# Patient Record
Sex: FEMALE | HISPANIC OR LATINO | Employment: FULL TIME | URBAN - METROPOLITAN AREA
[De-identification: names, ages, dates, MRNs, and addresses within clinical notes are randomized per-mention and may not be internally consistent; named-entity substitution may affect disease eponyms.]

---

## 2017-06-21 ENCOUNTER — OFFICE VISIT (OUTPATIENT)
Dept: FAMILY MEDICINE | Facility: CLINIC | Age: 22
End: 2017-06-21
Payer: COMMERCIAL

## 2017-06-21 VITALS
WEIGHT: 128 LBS | DIASTOLIC BLOOD PRESSURE: 70 MMHG | HEIGHT: 66 IN | SYSTOLIC BLOOD PRESSURE: 112 MMHG | OXYGEN SATURATION: 100 % | HEART RATE: 53 BPM | TEMPERATURE: 98.9 F | BODY MASS INDEX: 20.57 KG/M2

## 2017-06-21 DIAGNOSIS — R09.81 NASAL CONGESTION: ICD-10-CM

## 2017-06-21 DIAGNOSIS — J01.90 ACUTE SINUSITIS WITH SYMPTOMS > 10 DAYS: Primary | ICD-10-CM

## 2017-06-21 DIAGNOSIS — D50.9 IRON DEFICIENCY ANEMIA, UNSPECIFIED IRON DEFICIENCY ANEMIA TYPE: ICD-10-CM

## 2017-06-21 DIAGNOSIS — Z11.3 SCREEN FOR STD (SEXUALLY TRANSMITTED DISEASE): ICD-10-CM

## 2017-06-21 PROCEDURE — 85027 COMPLETE CBC AUTOMATED: CPT | Performed by: FAMILY MEDICINE

## 2017-06-21 PROCEDURE — 87491 CHLMYD TRACH DNA AMP PROBE: CPT | Performed by: FAMILY MEDICINE

## 2017-06-21 PROCEDURE — 82728 ASSAY OF FERRITIN: CPT | Performed by: FAMILY MEDICINE

## 2017-06-21 PROCEDURE — 36415 COLL VENOUS BLD VENIPUNCTURE: CPT | Performed by: FAMILY MEDICINE

## 2017-06-21 PROCEDURE — 99214 OFFICE O/P EST MOD 30 MIN: CPT | Performed by: FAMILY MEDICINE

## 2017-06-21 PROCEDURE — 83550 IRON BINDING TEST: CPT | Performed by: FAMILY MEDICINE

## 2017-06-21 PROCEDURE — 83540 ASSAY OF IRON: CPT | Performed by: FAMILY MEDICINE

## 2017-06-21 RX ORDER — AZITHROMYCIN 250 MG/1
TABLET, FILM COATED ORAL
Qty: 6 TABLET | Refills: 0 | Status: SHIPPED | OUTPATIENT
Start: 2017-06-21 | End: 2017-06-28

## 2017-06-21 RX ORDER — FERROUS SULFATE 325(65) MG
325 TABLET ORAL DAILY
Refills: 0 | COMMUNITY
Start: 2017-06-21

## 2017-06-21 RX ORDER — FLUTICASONE PROPIONATE 50 MCG
2 SPRAY, SUSPENSION (ML) NASAL DAILY
Qty: 1 BOTTLE | Refills: 0 | Status: SHIPPED | OUTPATIENT
Start: 2017-06-21 | End: 2018-01-09

## 2017-06-21 NOTE — MR AVS SNAPSHOT
"              After Visit Summary   2017    Hailey Faria    MRN: [id number]           Patient Information     Date Of Birth          1995        Visit Information        Provider Department      2017 2:00 PM Shireen Watts MD [facility]        Today's Diagnoses     Acute sinusitis with symptoms > 10 days    -  1    Nasal congestion        Iron deficiency anemia, unspecified iron deficiency anemia type        Screen for STD (sexually transmitted disease)           Follow-ups after your visit        Who to contact     If you have questions or need follow up information about today's clinic visit or your schedule please contact [facility] directly at [phone].  Normal or non-critical lab and imaging results will be communicated to you by MyChart, letter or phone within 4 business days after the clinic has received the results. If you do not hear from us within 7 days, please contact the clinic through MyChart or phone. If you have a critical or abnormal lab result, we will notify you by phone as soon as possible.  Submit refill requests through Scandid or call your pharmacy and they will forward the refill request to us. Please allow 3 business days for your refill to be completed.          Additional Information About Your Visit        MyChart Information     Scandid lets you send messages to your doctor, view your test results, renew your prescriptions, schedule appointments and more. To sign up, go to [email or web address] . Click on \"Log in\" on the left side of the screen, which will take you to the Welcome page. Then click on \"Sign up Now\" on the right side of the page.     You will be asked to enter the access code listed below, as well as some personal information. Please follow the directions to create your username and password.     Your access code is: [email or web address]  Expires: 2017  2:48 PM     Your access code will  in 90 days. If you " "need help or a new code, please call your [facility] clinic or [phone].        Care EveryWhere ID     This is your Care EveryWhere ID. This could be used by other organizations to access your [facility] medical records  [id number]        Your Vitals Were     Pulse Temperature Height Pulse Oximetry BMI (Body Mass Index)       53 98.9  F (37.2  C) (Tympanic) 5' 6\" (1.676 m) 100% 20.66 kg/m2        Blood Pressure from Last 3 Encounters:   06/21/17 112/70   05/02/16 122/74   03/22/15 94/60    Weight from Last 3 Encounters:   06/21/17 128 lb (58.1 kg)   05/02/16 134 lb (60.8 kg)   03/22/15 133 lb (60.3 kg)              We Performed the Following     CBC with platelets     CHLAMYDIA TRACHOMATIS PCR     Ferritin     Iron and iron binding capacity          Today's Medication Changes          These changes are accurate as of: 6/21/17  2:48 PM.  If you have any questions, ask your nurse or doctor.               Start taking these medicines.        Dose/Directions    azithromycin 250 MG tablet   Commonly known as:  ZITHROMAX   Used for:  Acute sinusitis with symptoms > 10 days   Started by:  Shireen Watts MD        Two tablets first day, then one tablet daily for four days.   Quantity:  6 tablet   Refills:  0       fluticasone 50 MCG/ACT spray   Commonly known as:  FLONASE   Used for:  Nasal congestion   Started by:  Shireen Watts MD        Dose:  2 spray   Spray 2 sprays into both nostrils daily   Quantity:  1 Bottle   Refills:  0         These medicines have changed or have updated prescriptions.        Dose/Directions    ferrous sulfate 325 (65 FE) MG tablet   Commonly known as:  IRON   This may have changed:  additional instructions   Changed by:  Shireen Watts MD        Take one by mouth daily   Refills:  0            Where to get your medicines      These medications were sent to [facility] [place] - [address] [place] [address]     Phone:  " [phone]     azithromycin 250 MG tablet    fluticasone 50 MCG/ACT spray                Primary Care Provider Office Phone # Fax #    NIKHIL Garcia [phone]       [facility]  [address]  [facility] [address]        Equal Access to Services     ANTONELLA LIANG : Hadii aad ku hadasho Soomaali, waaxda luqadaha, qaybta kaalmada adeegyada, waxay idiin hayaan adeeg kharash la'aan . So [facility] [phone].    ATENCIÓN: Si habla español, tiene a jackson disposición servicios gratuitos de asistencia lingüística. Giovanni al [phone].    We comply with applicable federal civil rights laws and [place] laws. We do not discriminate on the basis of race, color, national origin, age, disability sex, sexual orientation or gender identity.            Thank you!     Thank you for choosing [facility]  for your [place]. Our goal is always to provide you with excellent care. Hearing back from our patients is one way we can continue to improve our services. Please take a few minutes to complete the written survey that you may receive in the mail after your visit with us. Thank you!             Your Updated Medication List - Protect others around you: Learn how to safely use, store and throw away your medicines at www.disposemymeds.org.          This list is accurate as of: 6/21/17  2:48 PM.  Always use your most recent med list.                   Brand Name Dispense Instructions for use Diagnosis    azithromycin 250 MG tablet    ZITHROMAX    6 tablet    Two tablets first day, then one tablet daily for four days.    Acute sinusitis with symptoms > 10 days       ferrous sulfate 325 (65 FE) MG tablet    IRON     Take one by mouth daily        fluticasone 50 MCG/ACT spray    FLONASE    1 Bottle    Spray 2 sprays into both nostrils daily    Nasal congestion       levonorgestrel 20 MCG/24HR IUD    MIRENA    1 each    1 each (20 mcg) by Intrauterine route once for 1 dose

## 2017-06-21 NOTE — NURSING NOTE
"Chief Complaint   Patient presents with     URI       Initial /70  Pulse 53  Temp 98.9  F (37.2  C) (Tympanic)  Ht 5' 6\" (1.676 m)  Wt 128 lb (58.1 kg)  SpO2 100%  BMI 20.66 kg/m2 Estimated body mass index is 20.66 kg/(m^2) as calculated from the following:    Height as of this encounter: 5' 6\" (1.676 m).    Weight as of this encounter: 128 lb (58.1 kg).  Medication Reconciliation: complete  "

## 2017-06-21 NOTE — PROGRESS NOTES
SUBJECTIVE:                                                    Hailey Faria is a 21 year old female who presents to clinic today for the following health issues:      RESPIRATORY SYMPTOMS      Duration: 2 weeks     Description  nasal congestion, facial pain/pressure, headache and fatigue/malaise. Sneezing. No eye symptoms. Ears feel normal.     Severity: moderate to severe    Accompanying signs and symptoms: headache  persistent tiredness. Would like CBC checked for low iron levels     History (predisposing factors):  none    Precipitating or alleviating factors: None    Therapies tried and outcome:  Vitamin C      H/o iron deficiency anemia.         Problem list and histories reviewed & adjusted, as indicated.  Additional history: as documented    Patient Active Problem List   Diagnosis     Health Care Home     Past Surgical History:   Procedure Laterality Date     DENTAL SURGERY         Social History   Substance Use Topics     Smoking status: Never Smoker     Smokeless tobacco: Never Used     Alcohol use 0.0 oz/week     0 Standard drinks or equivalent per week      Comment: 2 a month     Family History   Problem Relation Age of Onset     Family History Negative           Current Outpatient Prescriptions   Medication Sig Dispense Refill     ferrous sulfate (IRON) 325 (65 FE) MG tablet Take one by mouth daily  0     levonorgestrel (MIRENA) 20 MCG/24HR IUD 1 each (20 mcg) by Intrauterine route once for 1 dose 1 each 0     azithromycin (ZITHROMAX) 250 MG tablet Two tablets first day, then one tablet daily for four days. 6 tablet 0     fluticasone (FLONASE) 50 MCG/ACT spray Spray 2 sprays into both nostrils daily 1 Bottle 0     No Known Allergies    Reviewed and updated as needed this visit by clinical staff       Reviewed and updated as needed this visit by Provider         ROS:  C: NEGATIVE for fever, chills, change in weight  GI: NEGATIVE for nausea, abdominal pain, heartburn, or change in bowel  "habits    OBJECTIVE:                                                    /70  Pulse 53  Temp 98.9  F (37.2  C) (Tympanic)  Ht 5' 6\" (1.676 m)  Wt 128 lb (58.1 kg)  SpO2 100%  BMI 20.66 kg/m2  Body mass index is 20.66 kg/(m^2).   GENERAL: healthy, alert, well nourished, well hydrated, no distress  EYES: Eyes grossly normal to inspection, extraocular movements - intact, and PERRL  HENT: ear canals- normal; TMs- normal; Nose- swollen b/l turbinates.; b/l maxillary sinus tenderness noted.  Mouth- no ulcers, no lesions  NECK: no tenderness, no adenopathy, no asymmetry, no masses, no stiffness; thyroid- normal to palpation  RESP: lungs clear to auscultation - no rales, no rhonchi, no wheezes  CV: regular rates and rhythm, normal S1 S2, no S3 or S4 and no murmur, no click or rub -         ASSESSMENT/PLAN:                                                      1. Acute sinusitis with symptoms > 10 days  Starting the patient on Z-pack for acute sinus infection. Recommended to stay well hydrated.     - azithromycin (ZITHROMAX) 250 MG tablet; Two tablets first day, then one tablet daily for four days.  Dispense: 6 tablet; Refill: 0    2. Nasal congestion  Start flonase to lower the nasal congestion. Stay well hydated.   - fluticasone (FLONASE) 50 MCG/ACT spray; Spray 2 sprays into both nostrils daily  Dispense: 1 Bottle; Refill: 0    3. Iron deficiency anemia, unspecified iron deficiency anemia type  Previous labs reviewed. Patient has significant iron deficiency anemia. Since then she had a Mirena IUD placed.   Labs ordered. Await the test results.  - Iron and iron binding capacity  - Ferritin  - CBC with platelets    4. Screen for STD (sexually transmitted disease)    - CHLAMYDIA TRACHOMATIS PCR      Follow up with Provider - as needed     Shireen Watts MD  [facility]    "

## 2017-06-22 LAB
C TRACH DNA SPEC QL NAA+PROBE: NORMAL
ERYTHROCYTE [DISTWIDTH] IN BLOOD BY AUTOMATED COUNT: 25.9 % (ref 10–15)
FERRITIN SERPL-MCNC: 12 NG/ML (ref 12–150)
HCT VFR BLD AUTO: 30.3 % (ref 35–47)
HGB BLD-MCNC: 8.4 G/DL (ref 11.7–15.7)
IRON SATN MFR SERPL: 14 % (ref 15–46)
IRON SERPL-MCNC: 68 UG/DL (ref 35–180)
MCH RBC QN AUTO: 22.5 PG (ref 26.5–33)
MCHC RBC AUTO-ENTMCNC: 27.7 G/DL (ref 31.5–36.5)
MCV RBC AUTO: 81 FL (ref 78–100)
PLATELET # BLD AUTO: 221 10E9/L (ref 150–450)
RBC # BLD AUTO: 3.74 10E12/L (ref 3.8–5.2)
SPECIMEN SOURCE: NORMAL
TIBC SERPL-MCNC: 473 UG/DL (ref 240–430)
WBC # BLD AUTO: 4.7 10E9/L (ref 4–11)

## 2017-06-23 NOTE — PROGRESS NOTES
Please call the patient to notify patient that the blood work is abnormal. i would like to see her back to review results with me next week      Shireen Watts MD

## 2017-06-28 ENCOUNTER — TELEPHONE (OUTPATIENT)
Dept: FAMILY MEDICINE | Facility: CLINIC | Age: 22
End: 2017-06-28

## 2017-06-28 ENCOUNTER — OFFICE VISIT (OUTPATIENT)
Dept: FAMILY MEDICINE | Facility: CLINIC | Age: 22
End: 2017-06-28
Payer: COMMERCIAL

## 2017-06-28 VITALS
TEMPERATURE: 97.4 F | DIASTOLIC BLOOD PRESSURE: 72 MMHG | BODY MASS INDEX: 20.41 KG/M2 | HEIGHT: 66 IN | OXYGEN SATURATION: 98 % | SYSTOLIC BLOOD PRESSURE: 118 MMHG | HEART RATE: 60 BPM | WEIGHT: 127 LBS

## 2017-06-28 DIAGNOSIS — D50.9 IRON DEFICIENCY ANEMIA, UNSPECIFIED IRON DEFICIENCY ANEMIA TYPE: Primary | ICD-10-CM

## 2017-06-28 PROCEDURE — 36415 COLL VENOUS BLD VENIPUNCTURE: CPT | Performed by: FAMILY MEDICINE

## 2017-06-28 PROCEDURE — 83021 HEMOGLOBIN CHROMOTOGRAPHY: CPT | Mod: 90 | Performed by: FAMILY MEDICINE

## 2017-06-28 PROCEDURE — 99214 OFFICE O/P EST MOD 30 MIN: CPT | Performed by: FAMILY MEDICINE

## 2017-06-28 PROCEDURE — 85060 BLOOD SMEAR INTERPRETATION: CPT | Performed by: FAMILY MEDICINE

## 2017-06-28 PROCEDURE — 85025 COMPLETE CBC W/AUTO DIFF WBC: CPT | Performed by: FAMILY MEDICINE

## 2017-06-28 PROCEDURE — 99000 SPECIMEN HANDLING OFFICE-LAB: CPT | Performed by: FAMILY MEDICINE

## 2017-06-28 PROCEDURE — 85045 AUTOMATED RETICULOCYTE COUNT: CPT | Performed by: FAMILY MEDICINE

## 2017-06-28 NOTE — MR AVS SNAPSHOT
"              After Visit Summary   2017    Hailey Faria    MRN: [id number]           Patient Information     Date Of Birth          1995        Visit Information        Provider Department      2017 2:00 PM Shireen Watts MD [facility]        Today's Diagnoses     Iron deficiency anemia, unspecified iron deficiency anemia type    -  1       Follow-ups after your visit        Who to contact     If you have questions or need follow up information about today's clinic visit or your schedule please contact [facility] directly at [phone].  Normal or non-critical lab and imaging results will be communicated to you by Endymedhart, letter or phone within 4 business days after the clinic has received the results. If you do not hear from us within 7 days, please contact the clinic through Endymedhart or phone. If you have a critical or abnormal lab result, we will notify you by phone as soon as possible.  Submit refill requests through deskwolf or call your pharmacy and they will forward the refill request to us. Please allow 3 business days for your refill to be completed.          Additional Information About Your Visit        MyChart Information     deskwolf lets you send messages to your doctor, view your test results, renew your prescriptions, schedule appointments and more. To sign up, go to [email or web address] . Click on \"Log in\" on the left side of the screen, which will take you to the Welcome page. Then click on \"Sign up Now\" on the right side of the page.     You will be asked to enter the access code listed below, as well as some personal information. Please follow the directions to create your username and password.     Your access code is: [email or web address]  Expires: 2017  2:48 PM     Your access code will  in 90 days. If you need help or a new code, please call your [facility] or [phone].        Care EveryWhere ID     This is your Care " "EveryWhere ID. This could be used by other organizations to access your [facility] medical records  [id number]        Your Vitals Were     Pulse Temperature Height Pulse Oximetry BMI (Body Mass Index)       60 97.4  F (36.3  C) (Tympanic) 5' 6\" (1.676 m) 98% 20.5 kg/m2        Blood Pressure from Last 3 Encounters:   06/28/17 118/72   06/21/17 112/70   05/02/16 122/74    Weight from Last 3 Encounters:   06/28/17 127 lb (57.6 kg)   06/21/17 128 lb (58.1 kg)   05/02/16 134 lb (60.8 kg)              We Performed the Following     Blood Morphology Pathologist Review     CBC with platelets and differential     HGB Eval Reflex to ELP or RBC Solubility     Reticulocyte Count        Primary Care Provider Office Phone # Fax #    NIKHIL Garcia [phone]       [facility]  [address]  [facility] [address]        Equal Access to Services     MADAY [facility] Center[facility] : Hadii aad ku hadasho Soomaali, waaxda luqadaha, qaybta kaalmada adeegyada, waxay idiin hayministerion roxi khalfonso shannon . So [facility] [phone].    ATENCIÓN: Si habla español, tiene a jackson disposición servicios gratuitos de asistencia lingüística. Llame al [phone].    We comply with applicable federal civil rights laws and [place] laws. We do not discriminate on the basis of race, color, national origin, age, disability sex, sexual orientation or gender identity.            Thank you!     Thank you for choosing [facility]  for your care. Our goal is always to provide you with excellent care. Hearing back from our patients is one way we can continue to improve our services. Please take a few minutes to complete the written survey that you may receive in the mail after your visit with us. Thank you!             Your Updated Medication List - Protect others around you: Learn how to safely use, store and throw away your medicines at www.disposemymeds.org.          This list is accurate as of: 6/28/17 11:59 PM. "  Always use your most recent med list.                   Brand Name Dispense Instructions for use Diagnosis    ferrous sulfate 325 (65 FE) MG tablet    IRON     Take one by mouth daily        fluticasone 50 MCG/ACT spray    FLONASE    1 Bottle    Spray 2 sprays into both nostrils daily    Nasal congestion       levonorgestrel 20 MCG/24HR IUD    MIRENA    1 each    1 each (20 mcg) by Intrauterine route once for 1 dose

## 2017-06-28 NOTE — PROGRESS NOTES
"  SUBJECTIVE:                                                    Hailey Faria is a 21 year old female who presents to clinic today for the following health issues:      Concern - follow-up abnormal labs from 6/21/17      Problem list and histories reviewed & adjusted, as indicated.  Additional history: as documented    Patient Active Problem List   Diagnosis     Health Care Home     Past Surgical History:   Procedure Laterality Date     DENTAL SURGERY         Social History   Substance Use Topics     Smoking status: Never Smoker     Smokeless tobacco: Never Used     Alcohol use 0.0 oz/week     0 Standard drinks or equivalent per week      Comment: 2 a month     Family History   Problem Relation Age of Onset     Family History Negative           Current Outpatient Prescriptions   Medication Sig Dispense Refill     ferrous sulfate (IRON) 325 (65 FE) MG tablet Take one by mouth daily  0     levonorgestrel (MIRENA) 20 MCG/24HR IUD 1 each (20 mcg) by Intrauterine route once for 1 dose 1 each 0     fluticasone (FLONASE) 50 MCG/ACT spray Spray 2 sprays into both nostrils daily 1 Bottle 0     No Known Allergies    Reviewed and updated as needed this visit by clinical staff       Reviewed and updated as needed this visit by Provider         ROS:  C: NEGATIVE for fever, chills, change in weight  E/M: NEGATIVE for ear, mouth and throat problems  R: NEGATIVE for significant cough or SOB  CV: NEGATIVE for chest pain, palpitations or peripheral edema    OBJECTIVE:                                                    /72  Pulse 60  Temp 97.4  F (36.3  C) (Tympanic)  Ht 5' 6\" (1.676 m)  Wt 127 lb (57.6 kg)  SpO2 98%  BMI 20.5 kg/m2  Body mass index is 20.5 kg/(m^2).   GENERAL: healthy, alert, well nourished, well hydrated, no distress  HENT: ear canals- normal; TMs- normal; Nose- normal; Mouth- no ulcers, no lesions  NECK: no tenderness, no adenopathy, no asymmetry, no masses, no stiffness; thyroid- normal to " palpation  RESP: lungs clear to auscultation - no rales, no rhonchi, no wheezes  CV: regular rates and rhythm, normal S1 S2, no S3 or S4 and no murmur, no click or rub -  ABDOMEN: soft, no tenderness, no  hepatosplenomegaly, no masses, normal bowel sounds         ASSESSMENT/PLAN:                                                        ICD-10-CM    1. Iron deficiency anemia, unspecified iron deficiency anemia type D50.9 HGB Eval Reflex to ELP or RBC Solubility     Reticulocyte Count     Blood Morphology Pathologist Review     CBC with platelets and differential   Labs reviewed with the patient.   Questionable etiology. Patient has Mirena in place and does not get a period and also she has been using iron supplements orally.   Recommended further investigation. Need to rule out thalassemia.   Also should evaluate for GI bleed.  If all work up in inconclusive, i would suggest trial of IV iron to see if hemoglobin reposnds to that or not. May also need hematology consult.     Follow up with Provider - after the results are back.      Shireen Watts MD  [facility] [place]

## 2017-06-28 NOTE — TELEPHONE ENCOUNTER
Pt would like to discuss lab results over the phone with Dr Watts rather than coming in.  Pt does have a scheduled appt at 2 pm today

## 2017-06-28 NOTE — NURSING NOTE
"Chief Complaint   Patient presents with     Abnormal Labs       Initial /72  Pulse 60  Temp 97.4  F (36.3  C) (Tympanic)  Ht 5' 6\" (1.676 m)  Wt 127 lb (57.6 kg)  SpO2 98%  BMI 20.5 kg/m2 Estimated body mass index is 20.5 kg/(m^2) as calculated from the following:    Height as of this encounter: 5' 6\" (1.676 m).    Weight as of this encounter: 127 lb (57.6 kg).  Medication Reconciliation: complete  "

## 2017-06-28 NOTE — TELEPHONE ENCOUNTER
LUCRETIA Watts   Spoke with the patient and informed her it would be best for her to come in. She states that it is difficult for her to come in because she has to come on her lunch break. Assured the patient that it would be a 20 minute appointment.  Raven Ferreira RN

## 2017-06-29 LAB
BASOPHILS # BLD AUTO: 0.1 10E9/L (ref 0–0.2)
BASOPHILS NFR BLD AUTO: 1.7 %
DIFFERENTIAL METHOD BLD: ABNORMAL
EOSINOPHIL # BLD AUTO: 0.1 10E9/L (ref 0–0.7)
EOSINOPHIL NFR BLD AUTO: 3.2 %
ERYTHROCYTE [DISTWIDTH] IN BLOOD BY AUTOMATED COUNT: 27.1 % (ref 10–15)
HCT VFR BLD AUTO: 32 % (ref 35–47)
HGB BLD-MCNC: 9.2 G/DL (ref 11.7–15.7)
IMM GRANULOCYTES # BLD: 0 10E9/L (ref 0–0.4)
IMM GRANULOCYTES NFR BLD: 0.2 %
LYMPHOCYTES # BLD AUTO: 2 10E9/L (ref 0.8–5.3)
LYMPHOCYTES NFR BLD AUTO: 49.4 %
MCH RBC QN AUTO: 23.7 PG (ref 26.5–33)
MCHC RBC AUTO-ENTMCNC: 28.8 G/DL (ref 31.5–36.5)
MCV RBC AUTO: 83 FL (ref 78–100)
MONOCYTES # BLD AUTO: 0.4 10E9/L (ref 0–1.3)
MONOCYTES NFR BLD AUTO: 9.7 %
NEUTROPHILS # BLD AUTO: 1.4 10E9/L (ref 1.6–8.3)
NEUTROPHILS NFR BLD AUTO: 35.8 %
NRBC # BLD AUTO: 0 10*3/UL
NRBC BLD AUTO-RTO: 0 /100
PLATELET # BLD AUTO: 237 10E9/L (ref 150–450)
RBC # BLD AUTO: 3.88 10E12/L (ref 3.8–5.2)
RETICS # AUTO: 182.3 10E9/L (ref 25–95)
RETICS/RBC NFR AUTO: 4.7 % (ref 0.5–2)
WBC # BLD AUTO: 4 10E9/L (ref 4–11)

## 2017-06-30 LAB
COPATH REPORT: NORMAL
HGB A1 MFR BLD: 96.9 %
HGB A2 MFR BLD: 2.7 %
HGB C MFR BLD: 0 %
HGB E MFR BLD: 0 %
HGB F MFR BLD: 0.4 %
HGB FRACT BLD ELPH-IMP: NORMAL
HGB OTHER MFR BLD: 0 %
HGB S BLD QL SOLY: NORMAL
HGB S MFR BLD: 0 %
PATH INTERP BLD-IMP: NORMAL

## 2017-07-03 NOTE — PROGRESS NOTES
Please call the patient to f/u with me to go over the test results and for further investigation.    Shireen Watts MD

## 2017-07-12 ENCOUNTER — TELEPHONE (OUTPATIENT)
Dept: FAMILY MEDICINE | Facility: CLINIC | Age: 22
End: 2017-07-12

## 2017-07-12 DIAGNOSIS — D50.9 HYPOCHROMIC ANEMIA: Primary | ICD-10-CM

## 2017-07-12 NOTE — TELEPHONE ENCOUNTER
Patient notified with information noted below from provider and agrees with plan.  Colette Dickerson RN  [facility] [employer]

## 2017-07-12 NOTE — TELEPHONE ENCOUNTER
Pt called back and does not want to come in to review her test results due to a copay each time she comes into the clinic. She would prefer to discuss the results over the phone. Did you want to schedule a telephone appt? Hay kelleye. Pt can be at [phone]. Thank you.  [place],

## 2017-07-12 NOTE — TELEPHONE ENCOUNTER
Patient notified and would like referral to hematologist for management.  Will do more research on the infusions but prefers to discuss ongoing with hematology for sure.  Colette Dickerson RN  [facility] [employer]

## 2017-07-12 NOTE — TELEPHONE ENCOUNTER
I would like to set her up for iron infusion. If she is in agreement to that, I will order it.   Otherwise, she can she hematologist for management of anemia as well.     Let me know, what she would like to do.    Shireen Watts MD  [facility] [place]

## 2017-07-13 ENCOUNTER — TELEPHONE (OUTPATIENT)
Dept: FAMILY MEDICINE | Facility: CLINIC | Age: 22
End: 2017-07-13

## 2017-07-13 NOTE — TELEPHONE ENCOUNTER
Pt called and has now decided to go ahead with iron infusions.  Please call pt so can be set up.  She is also going to make an appt with the specialist    Please call pt

## 2017-07-13 NOTE — TELEPHONE ENCOUNTER
I would suggest that she see the hematologist first and they can plan the infusion at the infusion center they use.     Shirene Watts MD  [facility], [place]

## 2017-07-13 NOTE — TELEPHONE ENCOUNTER
Spoke with patient and informed of below. Patient/ parent verbalized understanding and agrees with plan.   Kathrine Franco RN   [facility] - Triage

## 2017-07-19 ENCOUNTER — OFFICE VISIT (OUTPATIENT)
Dept: ONCOLOGY | Facility: CLINIC | Age: 22
End: 2017-07-19
Attending: FAMILY MEDICINE
Payer: COMMERCIAL

## 2017-07-19 ENCOUNTER — HOSPITAL ENCOUNTER (OUTPATIENT)
Facility: CLINIC | Age: 22
Setting detail: SPECIMEN
End: 2017-07-19
Attending: INTERNAL MEDICINE
Payer: COMMERCIAL

## 2017-07-19 ENCOUNTER — HOSPITAL ENCOUNTER (OUTPATIENT)
Facility: CLINIC | Age: 22
Setting detail: SPECIMEN
Discharge: HOME | End: 2017-07-19
Attending: INTERNAL MEDICINE | Admitting: INTERNAL MEDICINE
Payer: COMMERCIAL

## 2017-07-19 VITALS
DIASTOLIC BLOOD PRESSURE: 72 MMHG | HEART RATE: 51 BPM | BODY MASS INDEX: 20.98 KG/M2 | TEMPERATURE: 98.2 F | SYSTOLIC BLOOD PRESSURE: 122 MMHG | WEIGHT: 130 LBS | OXYGEN SATURATION: 100 % | RESPIRATION RATE: 16 BRPM

## 2017-07-19 DIAGNOSIS — D64.9 NORMOCYTIC ANEMIA: Primary | ICD-10-CM

## 2017-07-19 LAB
ALBUMIN SERPL-MCNC: 3.8 G/DL (ref 3.4–5)
ALP SERPL-CCNC: 84 U/L (ref 40–150)
ALT SERPL W P-5'-P-CCNC: 27 U/L (ref 0–50)
ANION GAP SERPL CALCULATED.3IONS-SCNC: 9 MMOL/L (ref 3–14)
AST SERPL W P-5'-P-CCNC: 24 U/L (ref 0–45)
BASOPHILS # BLD AUTO: 0 10E9/L (ref 0–0.2)
BASOPHILS NFR BLD AUTO: 1.2 %
BILIRUB SERPL-MCNC: 0.5 MG/DL (ref 0.2–1.3)
BUN SERPL-MCNC: 11 MG/DL (ref 7–30)
CALCIUM SERPL-MCNC: 9.4 MG/DL (ref 8.5–10.1)
CHLORIDE SERPL-SCNC: 107 MMOL/L (ref 94–109)
CO2 SERPL-SCNC: 25 MMOL/L (ref 20–32)
CREAT SERPL-MCNC: 0.58 MG/DL (ref 0.52–1.04)
DIFFERENTIAL METHOD BLD: ABNORMAL
EOSINOPHIL # BLD AUTO: 0.1 10E9/L (ref 0–0.7)
EOSINOPHIL NFR BLD AUTO: 3.3 %
ERYTHROCYTE [DISTWIDTH] IN BLOOD BY AUTOMATED COUNT: 22.9 % (ref 10–15)
FERRITIN SERPL-MCNC: 34 NG/ML (ref 12–150)
GFR SERPL CREATININE-BSD FRML MDRD: NORMAL ML/MIN/1.7M2
GLUCOSE SERPL-MCNC: 93 MG/DL (ref 70–99)
HCT VFR BLD AUTO: 34.6 % (ref 35–47)
HGB BLD-MCNC: 10.4 G/DL (ref 11.7–15.7)
IMM GRANULOCYTES # BLD: 0 10E9/L (ref 0–0.4)
IMM GRANULOCYTES NFR BLD: 0 %
IRON SATN MFR SERPL: 11 % (ref 15–46)
IRON SERPL-MCNC: 46 UG/DL (ref 35–180)
LDH SERPL L TO P-CCNC: 160 U/L (ref 81–234)
LYMPHOCYTES # BLD AUTO: 1.6 10E9/L (ref 0.8–5.3)
LYMPHOCYTES NFR BLD AUTO: 49.5 %
MCH RBC QN AUTO: 25.9 PG (ref 26.5–33)
MCHC RBC AUTO-ENTMCNC: 30.1 G/DL (ref 31.5–36.5)
MCV RBC AUTO: 86 FL (ref 78–100)
MONOCYTES # BLD AUTO: 0.3 10E9/L (ref 0–1.3)
MONOCYTES NFR BLD AUTO: 8.2 %
NEUTROPHILS # BLD AUTO: 1.2 10E9/L (ref 1.6–8.3)
NEUTROPHILS NFR BLD AUTO: 37.8 %
NRBC # BLD AUTO: 0 10*3/UL
NRBC BLD AUTO-RTO: 0 /100
PLATELET # BLD AUTO: 232 10E9/L (ref 150–450)
POTASSIUM SERPL-SCNC: 3.7 MMOL/L (ref 3.4–5.3)
PROT SERPL-MCNC: 7.4 G/DL (ref 6.8–8.8)
RBC # BLD AUTO: 4.01 10E12/L (ref 3.8–5.2)
RETICS # AUTO: 97 10E9/L (ref 25–95)
RETICS/RBC NFR AUTO: 2.4 % (ref 0.5–2)
SODIUM SERPL-SCNC: 141 MMOL/L (ref 133–144)
TIBC SERPL-MCNC: 413 UG/DL (ref 240–430)
TRANSFERRIN SERPL-MCNC: 336 MG/DL (ref 210–360)
TSH SERPL DL<=0.05 MIU/L-ACNC: 1.39 MU/L (ref 0.4–4)
VIT B12 SERPL-MCNC: 526 PG/ML (ref 193–986)
WBC # BLD AUTO: 3.3 10E9/L (ref 4–11)

## 2017-07-19 PROCEDURE — 84466 ASSAY OF TRANSFERRIN: CPT | Performed by: INTERNAL MEDICINE

## 2017-07-19 PROCEDURE — 82728 ASSAY OF FERRITIN: CPT | Performed by: INTERNAL MEDICINE

## 2017-07-19 PROCEDURE — 83540 ASSAY OF IRON: CPT | Performed by: INTERNAL MEDICINE

## 2017-07-19 PROCEDURE — 40000847 ZZHCL STATISTIC MORPHOLOGY W/INTERP HISTOLOGY TC 85060: Performed by: INTERNAL MEDICINE

## 2017-07-19 PROCEDURE — 82668 ASSAY OF ERYTHROPOIETIN: CPT | Performed by: INTERNAL MEDICINE

## 2017-07-19 PROCEDURE — 85045 AUTOMATED RETICULOCYTE COUNT: CPT | Performed by: INTERNAL MEDICINE

## 2017-07-19 PROCEDURE — 83550 IRON BINDING TEST: CPT | Performed by: INTERNAL MEDICINE

## 2017-07-19 PROCEDURE — 80053 COMPREHEN METABOLIC PANEL: CPT | Performed by: INTERNAL MEDICINE

## 2017-07-19 PROCEDURE — 99211 OFF/OP EST MAY X REQ PHY/QHP: CPT

## 2017-07-19 PROCEDURE — 84443 ASSAY THYROID STIM HORMONE: CPT | Performed by: INTERNAL MEDICINE

## 2017-07-19 PROCEDURE — 85025 COMPLETE CBC W/AUTO DIFF WBC: CPT | Performed by: INTERNAL MEDICINE

## 2017-07-19 PROCEDURE — 83615 LACTATE (LD) (LDH) ENZYME: CPT | Performed by: INTERNAL MEDICINE

## 2017-07-19 PROCEDURE — 83010 ASSAY OF HAPTOGLOBIN QUANT: CPT | Performed by: INTERNAL MEDICINE

## 2017-07-19 PROCEDURE — 99203 OFFICE O/P NEW LOW 30 MIN: CPT | Performed by: INTERNAL MEDICINE

## 2017-07-19 PROCEDURE — 85060 BLOOD SMEAR INTERPRETATION: CPT | Performed by: INTERNAL MEDICINE

## 2017-07-19 PROCEDURE — 82607 VITAMIN B-12: CPT | Performed by: INTERNAL MEDICINE

## 2017-07-19 ASSESSMENT — PAIN SCALES - GENERAL: PAINLEVEL: NO PAIN (0)

## 2017-07-19 NOTE — PROGRESS NOTES
"Oncology Rooming Note    July 19, 2017 2:26 PM   Hailey Faria is a 22 year old female who presents for:    Chief Complaint   Patient presents with     Oncology Clinic Visit     Anemia      Initial Vitals: /72 (BP Location: Right arm, Patient Position: Sitting, Cuff Size: Adult Regular)  Pulse 51  Temp 98.2  F (36.8  C) (Oral)  Resp 16  Wt 59 kg (130 lb)  SpO2 100%  BMI 20.98 kg/m2 Estimated body mass index is 20.98 kg/(m^2) as calculated from the following:    Height as of 6/28/17: 1.676 m (5' 6\").    Weight as of this encounter: 59 kg (130 lb). Body surface area is 1.66 meters squared.  No Pain (0) Comment: Data Unavailable   No LMP recorded. Patient is not currently having periods (Reason: IUD).  Allergies reviewed: Yes  Medications reviewed: Yes    Medications: Medication refills not needed today.  Pharmacy name entered into [employer]: [facility] [address] [place] [id number]    Clinical concerns: None     4 minutes for nursing intake (face to face time)     Mira Jose MA            "

## 2017-07-19 NOTE — MR AVS SNAPSHOT
"              After Visit Summary   7/19/2017    Hailey Faria    MRN: [id number]           Patient Information     Date Of Birth          1995        Visit Information        Provider Department      7/19/2017 2:30 PM Ines Camp MD [facility]        Today's Diagnoses     Normocytic anemia    -  1      Care Instructions    1- Labs today  2- RTC 2 weeks with infusion appointment for injectafer          Follow-ups after your visit        Your next 10 appointments already scheduled     Jul 31, 2017  7:30 AM CDT   Level 2 with  INFUSION CHAIR 8   [facility] ([facility])    [facility] [place]  [address]  [facility] [address] [phone]              Who to contact     If you have questions or need follow up information about today's clinic visit or your schedule please contact [facility] directly at [phone].  Normal or non-critical lab and imaging results will be communicated to you by OpenSpanhart, letter or phone within 4 business days after the clinic has received the results. If you do not hear from us within 7 days, please contact the clinic through OpenSpanhart or phone. If you have a critical or abnormal lab result, we will notify you by phone as soon as possible.  Submit refill requests through IndigoVision or call your pharmacy and they will forward the refill request to us. Please allow 3 business days for your refill to be completed.          Additional Information About Your Visit        [employer] Information     IndigoVision lets you send messages to your doctor, view your test results, renew your prescriptions, schedule appointments and more. To sign up, go to [email or web address] . Click on \"Log in\" on the left side of the screen, which will take you to the Welcome page. Then click on \"Sign up Now\" on the right side of the page.     You will be asked to enter the access code listed below, as well as " some personal information. Please follow the directions to create your username and password.     Your access code is: [email or web address]  Expires: 2017  2:48 PM     Your access code will  in 90 days. If you need help or a new code, please call your [facility] or [phone].        Care EveryWhere ID     This is your Care EveryWhere ID. This could be used by other organizations to access your [place] medical records  [id number]        Your Vitals Were     Pulse Temperature Respirations Pulse Oximetry BMI (Body Mass Index)       51 98.2  F (36.8  C) (Oral) 16 100% 20.98 kg/m2        Blood Pressure from Last 3 Encounters:   17 122/72   17 118/72   17 112/70    Weight from Last 3 Encounters:   17 59 kg (130 lb)   17 57.6 kg (127 lb)   17 58.1 kg (128 lb)              We Performed the Following     Bld morphology pathology review     CBC with platelets differential     Comprehensive metabolic panel     Erythropoietin     Ferritin     Haptoglobin     Iron and iron binding capacity     Lactate Dehydrogenase     Reticulocyte count     Transferrin     TSH     Vitamin B12        Primary Care Provider Office Phone # Fax #    NIKHIL Garcia [phone]       [facility]  [address] [facility] [address]        Equal Access to Services     ANTONELLA LIANG : Hadii aad ku hadasho Soomaali, waaxda luqadaha, qaybta kaalmada adeegyada, ken miller. So [facility] [phone].    ATENCIÓN: Si habla español, tiene a jackson disposición servicios gratuitos de asistencia lingüística. Giovanni al [phone].    We comply with applicable federal civil rights laws and [place] laws. We do not discriminate on the basis of race, color, national origin, age, disability sex, sexual orientation or gender identity.            Thank you!     Thank you for choosing [facility]  for your care. Our goal is always to provide  you with excellent care. Hearing back from our patients is one way we can continue to improve our services. Please take a few minutes to complete the written survey that you may receive in the mail after your visit with us. Thank you!             Your Updated Medication List - Protect others around you: Learn how to safely use, store and throw away your medicines at www.disposemymeds.org.          This list is accurate as of: 7/19/17  3:18 PM.  Always use your most recent med list.                   Brand Name Dispense Instructions for use Diagnosis    ferrous sulfate 325 (65 FE) MG tablet    IRON     Take one by mouth daily        fluticasone 50 MCG/ACT spray    FLONASE    1 Bottle    Spray 2 sprays into both nostrils daily    Nasal congestion       levonorgestrel 20 MCG/24HR IUD    MIRENA    1 each    1 each (20 mcg) by Intrauterine route once for 1 dose

## 2017-07-20 LAB
COPATH REPORT: NORMAL
EPO SERPL-ACNC: 59
HAPTOGLOB SERPL-MCNC: 49 MG/DL (ref 15–200)

## 2017-07-23 NOTE — PROGRESS NOTES
[facility] PHYSICIANS  HEMATOLOGY ONCOLOGY    REASON FOR CONSULTATION:  Normocytic anemia with history of iron deficiency.      Referring Provider:  Dr. Shireen Watts.    HISTORY OF PRESENT ILLNESS:  The patient is a 22-year-old lady who states that she has had long-term issues with anemia since her younger years.  She also states that in 2010 she had an evaluation by an esophagogastroduodenoscopy and colonoscopy in the hospital, and no source of blood loss was found.  He also states that since approximately 2 years ago she had an episode of rectal bleeding, and at that time, she was recommended to get an upper and lower gastrointestinal endoscopy, and she decided not to get it done.  She has been on oral iron supplement since 2014.  She is taking currently iron sulfate 325 milligrams daily.  She cannot take more than that due to gastrointestinal side effects.  She denies any significant issues with bleeding and bruising lately.  She had an intrauterine device done due to menstrual blood loss.  However, on detailed questioning, she states that since her menarche, she had low volume menstrual blood loss.  She would bleed for approximately 4 days, which would be very scant, and she would not go more than 1 pad a day.    PAST MEDICAL HISTORY:  Not significant.    MEDICATIONS:  Reviewed.    ALLERGIES:  Reviewed.    SOCIAL HISTORY:  She works in marketing.  She is a nonsmoker and does not drink alcohol.  She is single and has never been pregnant.    FAMILY HISTORY:  Mother's aunt had colon cancer.  Otherwise, no family history of gastrointestinal cancers or anemia.      REVIEW OF SYSTEMS:  Complete review of system was performed, found to be negative, other than pertinent positives mentioned in history of present illness.    PHYSICAL EXAM:    Vital Signs:  Blood pressure is 122/72, pulse 51, respirations 16, temperature 98.2.    CONSTITUTIONAL: Sitting comfortably.   HEENT: Pupils are equal. Oropharynx is  clear.   NECK: No cervical or supraclavicular lymphadenopathy.   RESPIRATORY: Clear bilaterally.   CARD/VASC: S1, S2, regular.   GI: Soft, nontender, nondistended, no hepatosplenomegaly.   MUSKULOSKELETAL: Warm, well perfused.   NEUROLOGIC: Alert, awake.   INTEGUMENT: No rash.   LYMPHATICS: No edema.   PSYCH: Mood and affect was normal.    LABORATORY DATA AND IMAGING REVIEWED DURING THIS VISIT:  Recent Labs   Lab Test  07/19/17   1504 06/27/14   NA  141  140   POTASSIUM  3.7  3.8   CHLORIDE  107  104   CO2  25   --    ANIONGAP  9   --    BUN  11  16   CR  0.58  0.67   GLC  93  93   MARY  9.4  9.0     Recent Labs   Lab Test  07/19/17   1504  06/28/17   1719  06/21/17   1435  08/06/14   WBC  3.3*  4.0  4.7   < >  3.3*   HGB  10.4*  9.2*  8.4*   < >  8.8*   PLT  232  237  221   < >  229   MCV  86  83  81   < >  76.8*   NEUTROPHIL  37.8  35.8   --    --   35.0    < > = values in this interval not displayed.     Recent Labs   Lab Test  07/19/17   1504 06/27/14   BILITOTAL  0.5  0.6   ALKPHOS  84  71   ALT  27  19   AST  24  24   ALBUMIN  3.8  4.3   LDH  160   --      Assessment AND RECOMMENDATIONS:  This is a 22-year-old lady who has a partially treated iron deficiency anemia.  The cause of iron deficiency in this patient is unclear.  There is no convincing history of heavy menstrual blood loss, and there has been no recent endoscopy, gastrointestinal assessment.  I discussed with the patient that she needs a gastrointestinal assessment ideally in the form of an upper and lower gastrointestinal endoscopy.  The patient has declined this intervention at this point.  I discussed that we can proceed with some laboratory testing today, which will include a repeat CBC, CMP, LDH, reticulocyte count, haptoglobin, erythropoietin level, B12, iron studies, and TSH today.  I can see her in approximately 2 weeks for another assessment and potential intravenous iron infusion, given she has not had significant help from oral iron.  The  patient asked many questions.  All of them were answered to satisfaction.    I appreciate the opportunity to participate in this patient's care.        CC:  Dr. Shireen Watts.        Ines Camp MD    D:  07/19/2017 16:21 T:  07/22/2017 13:56  Document:  [id number] AR\RS

## 2017-07-27 ENCOUNTER — TELEPHONE (OUTPATIENT)
Dept: INFUSION THERAPY | Facility: CLINIC | Age: 22
End: 2017-07-27

## 2017-07-27 NOTE — TELEPHONE ENCOUNTER
Per : Pt needs to come in for an exam appt rather than get Injectafer.    Attempted to reach Pt but had to LVM for Pt to call back to schedule an exam appt with .

## 2017-07-28 NOTE — TELEPHONE ENCOUNTER
Patient returned call and and had more questions to why Injectafer was cancelled and Dr. Camp is now requesting an exam. I reviewed basics of labs and her mind was put at ease but before making an appointment she would like this explained more. I will forward this message to Dr. Camp and have RN follow up with patient on Monday.Leslie Hussein

## 2018-01-09 ENCOUNTER — OFFICE VISIT (OUTPATIENT)
Dept: FAMILY MEDICINE | Facility: CLINIC | Age: 23
End: 2018-01-09
Payer: COMMERCIAL

## 2018-01-09 VITALS
OXYGEN SATURATION: 96 % | BODY MASS INDEX: 21.62 KG/M2 | RESPIRATION RATE: 16 BRPM | WEIGHT: 134.5 LBS | HEART RATE: 57 BPM | DIASTOLIC BLOOD PRESSURE: 70 MMHG | TEMPERATURE: 98 F | SYSTOLIC BLOOD PRESSURE: 110 MMHG | HEIGHT: 66 IN

## 2018-01-09 DIAGNOSIS — R05.9 COUGH: ICD-10-CM

## 2018-01-09 DIAGNOSIS — J01.90 ACUTE SINUSITIS WITH SYMPTOMS > 10 DAYS: Primary | ICD-10-CM

## 2018-01-09 PROCEDURE — 99213 OFFICE O/P EST LOW 20 MIN: CPT | Performed by: NURSE PRACTITIONER

## 2018-01-09 RX ORDER — DAPSONE 75 MG/G
GEL TOPICAL
Refills: 0 | COMMUNITY
Start: 2017-11-22 | End: 2018-10-01

## 2018-01-09 RX ORDER — AZITHROMYCIN 250 MG/1
TABLET, FILM COATED ORAL
Qty: 6 TABLET | Refills: 0 | Status: SHIPPED | OUTPATIENT
Start: 2018-01-09 | End: 2018-10-01

## 2018-01-09 RX ORDER — FLUTICASONE PROPIONATE 50 MCG
1-2 SPRAY, SUSPENSION (ML) NASAL DAILY
Qty: 1 BOTTLE | Refills: 11 | Status: ON HOLD | OUTPATIENT
Start: 2018-01-09 | End: 2019-12-18

## 2018-01-09 RX ORDER — ADAPALENE GEL USP, 0.3% 3 MG/G
GEL TOPICAL
Refills: 0 | COMMUNITY
Start: 2017-11-22 | End: 2018-10-01

## 2018-01-09 RX ORDER — SPIRONOLACTONE 50 MG/1
TABLET, FILM COATED ORAL
Refills: 0 | Status: ON HOLD | COMMUNITY
Start: 2017-11-22 | End: 2019-12-18

## 2018-01-09 NOTE — MR AVS SNAPSHOT
After Visit Summary   1/9/2018    Hailey Faria    MRN: [id number]           Patient Information     Date Of Birth          1995        Visit Information        Provider Department      1/9/2018 10:30 AM Bernie Santacruz NP [facility]        Today's Diagnoses     Acute sinusitis with symptoms > 10 days    -  1    Cough           Follow-ups after your visit        Additional Services     OTOLARYNGOLOGY REFERRAL       Your provider has referred you to: N: [facility] [phone]   [email or web address]    Please be aware that coverage of these services is subject to the terms and limitations of your health insurance plan.  Call member services at your health plan with any benefit or coverage questions.      Please bring the following with you to your appointment:    (1) Any X-Rays, CTs or MRIs which have been performed.  Contact the facility where they were done to arrange for  prior to your scheduled appointment.   (2) List of current medications  (3) This referral request   (4) Any documents/labs given to you for this referral                  Who to contact     If you have questions or need follow up information about today's clinic visit or your schedule please contact [facility] directly at [phone].  Normal or non-critical lab and imaging results will be communicated to you by MyChart, letter or phone within 4 business days after the clinic has received the results. If you do not hear from us within 7 days, please contact the clinic through MyChart or phone. If you have a critical or abnormal lab result, we will notify you by phone as soon as possible.  Submit refill requests through spotflux or call your pharmacy and they will forward the refill request to us. Please allow 3 business days for your refill to be completed.          Additional Information About Your  "Visit        [employer] Information     Vaxart lets you send messages to your doctor, view your test results, renew your prescriptions, schedule appointments and more. To sign up, go to [email or web address] . Click on \"Log in\" on the left side of the screen, which will take you to the Welcome page. Then click on \"Sign up Now\" on the right side of the page.     You will be asked to enter the access code listed below, as well as some personal information. Please follow the directions to create your username and password.     Your access code is: [email or web address]  Expires: 2018 11:07 AM     Your access code will  in 90 days. If you need help or a new code, please call your [place] clinic or [phone].        Care EveryWhere ID     This is your Care EveryWhere ID. This could be used by other organizations to access your [place] medical records  [id number]        Your Vitals Were     Pulse Temperature Respirations Height Pulse Oximetry Breastfeeding?    57 98  F (36.7  C) (Oral) 16 5' 6\" (1.676 m) 96% No    BMI (Body Mass Index)                   21.71 kg/m2            Blood Pressure from Last 3 Encounters:   18 110/70   17 122/72   17 118/72    Weight from Last 3 Encounters:   18 134 lb 8 oz (61 kg)   17 130 lb (59 kg)   17 127 lb (57.6 kg)              We Performed the Following     OTOLARYNGOLOGY REFERRAL          Today's Medication Changes          These changes are accurate as of: 18 11:07 AM.  If you have any questions, ask your nurse or doctor.               Start taking these medicines.        Dose/Directions    azithromycin 250 MG tablet   Commonly known as:  ZITHROMAX   Used for:  Acute sinusitis with symptoms > 10 days   Started by:  Bernie Santacruz NP        Two tablets first day, then one tablet daily for four days.   Quantity:  6 tablet   Refills:  0       fluticasone 50 MCG/ACT spray   Commonly known as:  FLONASE   Used for:  Acute sinusitis with " symptoms > 10 days   Started by:  Bernie Santacruz NP        Dose:  1-2 spray   Spray 1-2 sprays into both nostrils daily   Quantity:  1 Bottle   Refills:  11            Where to get your medicines      These medications were sent to [place] [address] [place] [facility] [address] [facility]  [address] [facility] [address]    Hours:  Tech issues with their phone system Phone:  [phone]     azithromycin 250 MG tablet    fluticasone 50 MCG/ACT spray                Primary Care Provider Office Phone # Fax #    NIKHIL Garcia [phone] [address]  [facility] [address]        Equal Access to Services     MADAY LIANG : Hadii hang griffiths hadasho Somello, waaxda luqadaha, qaybta kaalmada adelolyyada, ken miller. So [facility] [phone].    ATENCIÓN: Si habla español, tiene a jackson disposición servicios gratuitos de asistencia lingüística. Llame al [phone].    We comply with applicable federal civil rights laws and [place] laws. We do not discriminate on the basis of race, color, national origin, age, disability, sex, sexual orientation, or gender identity.            Thank you!     Thank you for choosing [facility]  for your care. Our goal is always to provide you with excellent care. Hearing back from our patients is one way we can continue to improve our services. Please take a few minutes to complete the written survey that you may receive in the mail after your visit with us. Thank you!             Your Updated Medication List - Protect others around you: Learn how to safely use, store and throw away your medicines at www.disposemymeds.org.          This list is accurate as of: 1/9/18 11:07 AM.  Always use your most recent med list.                   Brand Name Dispense Instructions for use Diagnosis    ACZONE 7.5 % gel   Generic drug:  dapsone      APPLY TO FACE EVERY MORNING        adapalene 0.3 % gel      APPLY TO  FACE        azithromycin 250 MG tablet    ZITHROMAX    6 tablet    Two tablets first day, then one tablet daily for four days.    Acute sinusitis with symptoms > 10 days       ferrous sulfate 325 (65 FE) MG tablet    IRON     Take one by mouth daily        fluticasone 50 MCG/ACT spray    FLONASE    1 Bottle    Spray 1-2 sprays into both nostrils daily    Acute sinusitis with symptoms > 10 days       levonorgestrel 20 MCG/24HR IUD    MIRENA    1 each    1 each (20 mcg) by Intrauterine route once for 1 dose        spironolactone 50 MG tablet    ALDACTONE     TAKE 1 TABLET BY MOUTH TWICE A DAY

## 2018-01-09 NOTE — NURSING NOTE
"Chief Complaint   Patient presents with     Sinus Problem       Initial /70 (BP Location: Right arm, Patient Position: Chair, Cuff Size: Adult Regular)  Pulse 57  Temp 98  F (36.7  C) (Oral)  Resp 16  Ht 5' 6\" (1.676 m)  Wt 134 lb 8 oz (61 kg)  SpO2 96%  Breastfeeding? No  BMI 21.71 kg/m2 Estimated body mass index is 21.71 kg/(m^2) as calculated from the following:    Height as of this encounter: 5' 6\" (1.676 m).    Weight as of this encounter: 134 lb 8 oz (61 kg).  Medication Reconciliation: complete      Health Maintenance addressed:  NONE    N/a    JEMMA Olmedo        "

## 2018-01-09 NOTE — PROGRESS NOTES
"  SUBJECTIVE:   Hailey Faria is a 22 year old female who presents to clinic today for the following health issues:      Acute Illness   Acute illness concerns: sinus  Onset: 1 week     Fever: no    Chills/Sweats: no    Headache (location?): YES    Sinus Pressure:YES    Conjunctivitis:  no    Ear Pain: YES: bilateral    Rhinorrhea: YES    Congestion: YES    Sore Throat: no     Cough: YES    Wheeze: no    Decreased Appetite: no    Nausea: no    Vomiting: no    Diarrhea:  no    Dysuria/Freq.: no    Fatigue/Achiness: no    Sick/Strep Exposure: no     Therapies Tried and outcome: sudafed, advil, delsym     Here with facial pain and sinus congestion for the past week to 10 days. Headache for the past week. Bilateral ear pain as well. Taking sudafed. Has had ongoing issues with constant nasal congestion. Is concerned about chronic inflammation.         Problem list and histories reviewed & adjusted, as indicated.  Additional history: none    Patient Active Problem List   Diagnosis     Health Care Home     Past Surgical History:   Procedure Laterality Date     DENTAL SURGERY         Social History   Substance Use Topics     Smoking status: Never Smoker     Smokeless tobacco: Never Used     Alcohol use 0.0 oz/week     0 Standard drinks or equivalent per week      Comment: 2 a month     Family History   Problem Relation Age of Onset     Family History Negative               Reviewed and updated as needed this visit by clinical staffTobacco  Allergies  Meds  Problems  Med Hx  Surg Hx  Fam Hx  Soc Hx        Reviewed and updated as needed this visit by Provider  Allergies  Meds  Problems  Med Hx  Surg Hx  Fam Hx         ROS:  Constitutional, HEENT, cardiovascular, pulmonary, gi and gu systems are negative, except as otherwise noted.      OBJECTIVE:   /70 (BP Location: Right arm, Patient Position: Chair, Cuff Size: Adult Regular)  Pulse 57  Temp 98  F (36.7  C) (Oral)  Resp 16  Ht 5' 6\" (1.676 m)  " Wt 134 lb 8 oz (61 kg)  SpO2 96%  Breastfeeding? No  BMI 21.71 kg/m2  Body mass index is 21.71 kg/(m^2).  GENERAL: healthy, alert and no distress  EYES: Eyes grossly normal to inspection, PERRL and conjunctivae and sclerae normal  HENT: normal cephalic/atraumatic, ear canals and TM's normal, nose and mouth without ulcers or lesions, oropharynx clear, oral mucous membranes moist and sinuses: maxillary tenderness on bilateral. Swollen turbinates.   NECK: no adenopathy, no asymmetry, masses, or scars and thyroid normal to palpation  RESP: lungs clear to auscultation - no rales, rhonchi or wheezes  CV: regular rate and rhythm, normal S1 S2, no S3 or S4, no murmur, click or rub, no peripheral edema and peripheral pulses strong        ASSESSMENT/PLAN:             1. Acute sinusitis with symptoms > 10 days  Recommended use of flonase daily. Azithromycin for sinus infection.   - fluticasone (FLONASE) 50 MCG/ACT spray; Spray 1-2 sprays into both nostrils daily  Dispense: 1 Bottle; Refill: 11  - azithromycin (ZITHROMAX) 250 MG tablet; Two tablets first day, then one tablet daily for four days.  Dispense: 6 tablet; Refill: 0    2. Cough  Advised use of antihistamine daily and have placed a referral to ENT to evaluate for constant nasal congestion.   - OTOLARYNGOLOGY REFERRAL        Bernie Santacruz NP  [facility]

## 2018-10-01 ENCOUNTER — OFFICE VISIT (OUTPATIENT)
Dept: FAMILY MEDICINE | Facility: CLINIC | Age: 23
End: 2018-10-01
Payer: COMMERCIAL

## 2018-10-01 VITALS
TEMPERATURE: 98 F | RESPIRATION RATE: 16 BRPM | WEIGHT: 132.2 LBS | DIASTOLIC BLOOD PRESSURE: 60 MMHG | HEIGHT: 66 IN | OXYGEN SATURATION: 100 % | SYSTOLIC BLOOD PRESSURE: 100 MMHG | BODY MASS INDEX: 21.24 KG/M2 | HEART RATE: 62 BPM

## 2018-10-01 DIAGNOSIS — J01.90 ACUTE SINUSITIS WITH SYMPTOMS > 10 DAYS: ICD-10-CM

## 2018-10-01 PROCEDURE — 99213 OFFICE O/P EST LOW 20 MIN: CPT | Performed by: FAMILY MEDICINE

## 2018-10-01 RX ORDER — AZITHROMYCIN 250 MG/1
TABLET, FILM COATED ORAL
Qty: 6 TABLET | Refills: 0 | Status: SHIPPED | OUTPATIENT
Start: 2018-10-01 | End: 2019-12-18

## 2018-10-01 NOTE — MR AVS SNAPSHOT
"              After Visit Summary   10/1/2018    Hailey Faria    MRN: [id number]           Patient Information     Date Of Birth          1995        Visit Information        Provider Department      10/1/2018 3:15 PM Nemesio El MD [facility]        Today's Diagnoses     Acute sinusitis with symptoms > 10 days           Follow-ups after your visit        Who to contact     If you have questions or need follow up information about today's clinic visit or your schedule please contact [facility] directly at [phone].  Normal or non-critical lab and imaging results will be communicated to you by MyChart, letter or phone within 4 business days after the clinic has received the results. If you do not hear from us within 7 days, please contact the clinic through MyChart or phone. If you have a critical or abnormal lab result, we will notify you by phone as soon as possible.  Submit refill requests through [employer] or call your pharmacy and they will forward the refill request to us. Please allow 3 business days for your refill to be completed.          Additional Information About Your Visit        Care EveryWhere ID     This is your Care EveryWhere ID. This could be used by other organizations to access your Angela medical records  [id number]        Your Vitals Were     Pulse Temperature Respirations Height Pulse Oximetry Breastfeeding?    62 98  F (36.7  C) (Oral) 16 5' 6\" (1.676 m) 100% No    BMI (Body Mass Index)                   21.34 kg/m2            Blood Pressure from Last 3 Encounters:   10/01/18 100/60   01/09/18 110/70   07/19/17 122/72    Weight from Last 3 Encounters:   10/01/18 132 lb 3.2 oz (60 kg)   01/09/18 134 lb 8 oz (61 kg)   07/19/17 130 lb (59 kg)              Today, you had the following     No orders found for display         Where to get your medicines      These medications were sent to [place] [address] [place] [facility] [address]  [address] [facility] [address]    Hours:  Tech issues with their phone system Phone:  [phone]     azithromycin 250 MG tablet          Primary Care Provider Office Phone # Fax #    NIKHIL Garcia [phone] [address]  [facility] [address]        Equal Access to Services     [facility]: Hadii aad ku hadasho Soomaali, waaxda luqadaha, qaybta kaalmada adeegyada, waxay idiin hayaan adeeg kharash la'aan ah. So [facility] [phone].    ATENCIÓN: Si habla español, tiene a jackson disposición servicios gratuitos de asistencia lingüística. Giovanni al [phone].    We comply with applicable federal civil rights laws and [place] laws. We do not discriminate on the basis of race, color, national origin, age, disability, sex, sexual orientation, or gender identity.            Thank you!     Thank you for choosing [facility]  for your care. Our goal is always to provide you with excellent care. Hearing back from our patients is one way we can continue to improve our services. Please take a few minutes to complete the written survey that you may receive in the mail after your visit with us. Thank you!             Your Updated Medication List - Protect others around you: Learn how to safely use, store and throw away your medicines at www.disposemymeds.org.          This list is accurate as of 10/1/18  3:42 PM.  Always use your most recent med list.                   Brand Name Dispense Instructions for use Diagnosis    azithromycin 250 MG tablet    ZITHROMAX    6 tablet    Two tablets first day, then one tablet daily for four days.    Acute sinusitis with symptoms > 10 days       ferrous sulfate 325 (65 Fe) MG tablet    IRON     Take one by mouth daily        fluticasone 50 MCG/ACT spray    FLONASE    1 Bottle    Spray 1-2 sprays into both nostrils daily    Acute sinusitis with symptoms > 10 days       levonorgestrel 20 MCG/24HR IUD    MIRENA    1 each     1 each (20 mcg) by Intrauterine route once for 1 dose        spironolactone 50 MG tablet    ALDACTONE     TAKE 1 TABLET BY MOUTH TWICE A DAY

## 2018-10-01 NOTE — PROGRESS NOTES
"  SUBJECTIVE:   Hailey Faria is a 23 year old female who presents to clinic today for the following health issues:    Acute Illness   Acute illness concerns: sinus infection   Onset: almost 2 weeks     Fever: no    Chills/Sweats: YES    Headache (location?): YES    Sinus Pressure:YES    Conjunctivitis:  no    Ear Pain: no    Rhinorrhea: YES    Congestion: YES    Sore Throat: YES     Cough: YES-productive of clear sputum    Wheeze: no    Decreased Appetite: no    Nausea: no    Vomiting: no    Diarrhea:  no    Dysuria/Freq.: no    Fatigue/Achiness: no    Sick/Strep Exposure: no     Therapies Tried and outcome: Advil, tylenol cold and flu, airborne     ROS:  General, neuro, sleep, psych, musculoskeletal system otherwise negative.     /60 (BP Location: Right arm, Patient Position: Chair, Cuff Size: Adult Regular)  Pulse 62  Temp 98  F (36.7  C) (Oral)  Resp 16  Ht 5' 6\" (1.676 m)  Wt 132 lb 3.2 oz (60 kg)  SpO2 100%  Breastfeeding? No  BMI 21.34 kg/m2    GENERAL: healthy, alert and no distress  EYES: Eyes grossly normal to inspection, PERRL and conjunctivae and sclerae normal  HENT: swollen turbinates both nares, signs of PND  NECK: no adenopathy, no asymmetry, masses, or scars and thyroid normal to palpation  RESP: lungs clear to auscultation - no rales, rhonchi or wheezes  CV: regular rate and rhythm, normal S1 S2, no S3 or S4, no murmur, click or rub, no peripheral edema and peripheral pulses strong  MS: no gross musculoskeletal defects noted, no edema  SKIN: no suspicious lesions or rashes  NEURO: Normal strength and tone, mentation intact and speech normal  PSYCH: mentation appears normal, affect normal/bright    ASSESSMENT:  1. Acute sinusitis with symptoms > 10 days  Recommend nasonex and otc antihistamine like claritin   Pt instructed to come back to the clinic for worsening sx    Symptomatic cares were discussed in detail.   "

## 2018-10-10 DIAGNOSIS — J01.90 ACUTE SINUSITIS WITH SYMPTOMS > 10 DAYS: ICD-10-CM

## 2018-10-10 NOTE — TELEPHONE ENCOUNTER
"Requested Prescriptions   Pending Prescriptions Disp Refills     fluticasone (FLONASE) 50 MCG/ACT spray  Last Written Prescription Date:  01/09/2018  Last Fill Quantity: 1,  # refills: 11   Last office visit: No previous visit found with prescribing provider:  01/09/2018   Future Office Visit:     1 Bottle 11     Sig: Spray 1-2 sprays into both nostrils daily    Inhaled Steroids Protocol Passed    10/10/2018  4:33 PM       Passed - Patient is age 12 or older       Passed - Recent (12 mo) or future (30 days) visit within the authorizing provider's specialty    Patient had office visit in the last 12 months or has a visit in the next 30 days with authorizing provider or within the authorizing provider's specialty.  See \"Patient Info\" tab in inbasket, or \"Choose Columns\" in Meds & Orders section of the refill encounter.              "

## 2018-10-11 RX ORDER — FLUTICASONE PROPIONATE 50 MCG
1-2 SPRAY, SUSPENSION (ML) NASAL DAILY
Qty: 1 BOTTLE | Refills: 11 | OUTPATIENT
Start: 2018-10-11

## 2018-10-11 NOTE — TELEPHONE ENCOUNTER
Duplicate  E-Prescribing Status: Receipt confirmed by pharmacy (1/9/2018 11:07 AM CST)  Em Corcoran RN, BSN

## 2019-12-18 ENCOUNTER — APPOINTMENT (OUTPATIENT)
Dept: ULTRASOUND IMAGING | Facility: CLINIC | Age: 24
DRG: 743 | End: 2019-12-18
Attending: EMERGENCY MEDICINE
Payer: COMMERCIAL

## 2019-12-18 ENCOUNTER — APPOINTMENT (OUTPATIENT)
Dept: CT IMAGING | Facility: CLINIC | Age: 24
DRG: 743 | End: 2019-12-18
Attending: EMERGENCY MEDICINE
Payer: COMMERCIAL

## 2019-12-18 ENCOUNTER — ANESTHESIA EVENT (OUTPATIENT)
Dept: SURGERY | Facility: CLINIC | Age: 24
DRG: 743 | End: 2019-12-18
Payer: COMMERCIAL

## 2019-12-18 ENCOUNTER — APPOINTMENT (OUTPATIENT)
Dept: ULTRASOUND IMAGING | Facility: CLINIC | Age: 24
DRG: 743 | End: 2019-12-18
Attending: PHYSICIAN ASSISTANT
Payer: COMMERCIAL

## 2019-12-18 ENCOUNTER — ANESTHESIA (OUTPATIENT)
Dept: SURGERY | Facility: CLINIC | Age: 24
DRG: 743 | End: 2019-12-18
Payer: COMMERCIAL

## 2019-12-18 ENCOUNTER — HOSPITAL ENCOUNTER (INPATIENT)
Facility: CLINIC | Age: 24
LOS: 1 days | Discharge: HOME | DRG: 743 | End: 2019-12-19
Attending: EMERGENCY MEDICINE | Admitting: OBSTETRICS & GYNECOLOGY
Payer: COMMERCIAL

## 2019-12-18 DIAGNOSIS — N83.201 CYST OF RIGHT OVARY: ICD-10-CM

## 2019-12-18 DIAGNOSIS — R10.31 ABDOMINAL PAIN, RIGHT LOWER QUADRANT: ICD-10-CM

## 2019-12-18 DIAGNOSIS — G89.18 POST-OP PAIN: Primary | ICD-10-CM

## 2019-12-18 PROBLEM — R10.9 RIGHT FLANK PAIN: Status: ACTIVE | Noted: 2019-12-18

## 2019-12-18 LAB
ABO + RH BLD: NORMAL
ABO + RH BLD: NORMAL
ALBUMIN SERPL-MCNC: 2.5 G/DL (ref 3.4–5)
ALBUMIN UR-MCNC: NEGATIVE MG/DL
ALP SERPL-CCNC: 66 U/L (ref 40–150)
ALT SERPL W P-5'-P-CCNC: 32 U/L (ref 0–50)
ANION GAP SERPL CALCULATED.3IONS-SCNC: 3 MMOL/L (ref 3–14)
APPEARANCE UR: CLEAR
AST SERPL W P-5'-P-CCNC: 39 U/L (ref 0–45)
B-HCG FREE SERPL-ACNC: 13.9 IU/L
B-HCG SERPL-ACNC: <1 IU/L (ref 0–5)
B-HCG SERPL-ACNC: <1 IU/L (ref 0–5)
BASOPHILS # BLD AUTO: 0.1 10E9/L (ref 0–0.2)
BASOPHILS NFR BLD AUTO: 0.4 %
BILIRUB SERPL-MCNC: 0.4 MG/DL (ref 0.2–1.3)
BILIRUB UR QL STRIP: NEGATIVE
BLD GP AB SCN SERPL QL: NORMAL
BLOOD BANK CMNT PATIENT-IMP: NORMAL
BUN SERPL-MCNC: 4 MG/DL (ref 7–30)
CALCIUM SERPL-MCNC: 8.5 MG/DL (ref 8.5–10.1)
CHLORIDE SERPL-SCNC: 107 MMOL/L (ref 94–109)
CO2 SERPL-SCNC: 28 MMOL/L (ref 20–32)
COLOR UR AUTO: YELLOW
CREAT SERPL-MCNC: 0.48 MG/DL (ref 0.52–1.04)
DIFFERENTIAL METHOD BLD: ABNORMAL
EOSINOPHIL # BLD AUTO: 0.2 10E9/L (ref 0–0.7)
EOSINOPHIL NFR BLD AUTO: 1.8 %
ERYTHROCYTE [DISTWIDTH] IN BLOOD BY AUTOMATED COUNT: 11.8 % (ref 10–15)
ERYTHROCYTE [DISTWIDTH] IN BLOOD BY AUTOMATED COUNT: 11.9 % (ref 10–15)
GFR SERPL CREATININE-BSD FRML MDRD: >90 ML/MIN/{1.73_M2}
GLUCOSE SERPL-MCNC: 109 MG/DL (ref 70–99)
GLUCOSE UR STRIP-MCNC: NEGATIVE MG/DL
HCT VFR BLD AUTO: 37.4 % (ref 35–47)
HCT VFR BLD AUTO: 43.6 % (ref 35–47)
HGB BLD-MCNC: 11.8 G/DL (ref 11.7–15.7)
HGB BLD-MCNC: 12.2 G/DL (ref 11.7–15.7)
HGB BLD-MCNC: 14 G/DL (ref 11.7–15.7)
HGB UR QL STRIP: NEGATIVE
IMM GRANULOCYTES # BLD: 0 10E9/L (ref 0–0.4)
IMM GRANULOCYTES NFR BLD: 0.3 %
KETONES UR STRIP-MCNC: NEGATIVE MG/DL
LEUKOCYTE ESTERASE UR QL STRIP: NEGATIVE
LIPASE SERPL-CCNC: 105 U/L (ref 73–393)
LYMPHOCYTES # BLD AUTO: 1.3 10E9/L (ref 0.8–5.3)
LYMPHOCYTES NFR BLD AUTO: 10.4 %
MCH RBC QN AUTO: 32.7 PG (ref 26.5–33)
MCH RBC QN AUTO: 32.7 PG (ref 26.5–33)
MCHC RBC AUTO-ENTMCNC: 32.1 G/DL (ref 31.5–36.5)
MCHC RBC AUTO-ENTMCNC: 32.6 G/DL (ref 31.5–36.5)
MCV RBC AUTO: 100 FL (ref 78–100)
MCV RBC AUTO: 102 FL (ref 78–100)
MONOCYTES # BLD AUTO: 1 10E9/L (ref 0–1.3)
MONOCYTES NFR BLD AUTO: 8.1 %
MUCOUS THREADS #/AREA URNS LPF: PRESENT /LPF
NEUTROPHILS # BLD AUTO: 9.9 10E9/L (ref 1.6–8.3)
NEUTROPHILS NFR BLD AUTO: 79 %
NITRATE UR QL: NEGATIVE
NRBC # BLD AUTO: 0 10*3/UL
NRBC BLD AUTO-RTO: 0 /100
PH UR STRIP: 6 PH (ref 5–7)
PLATELET # BLD AUTO: 221 10E9/L (ref 150–450)
PLATELET # BLD AUTO: 236 10E9/L (ref 150–450)
POTASSIUM SERPL-SCNC: 3.8 MMOL/L (ref 3.4–5.3)
PROT SERPL-MCNC: 5.7 G/DL (ref 6.8–8.8)
RBC # BLD AUTO: 3.73 10E12/L (ref 3.8–5.2)
RBC # BLD AUTO: 4.28 10E12/L (ref 3.8–5.2)
RBC #/AREA URNS AUTO: 0 /HPF (ref 0–2)
SODIUM SERPL-SCNC: 138 MMOL/L (ref 133–144)
SOURCE: ABNORMAL
SP GR UR STRIP: 1.02 (ref 1–1.03)
SPECIMEN EXP DATE BLD: NORMAL
UROBILINOGEN UR STRIP-MCNC: NORMAL MG/DL (ref 0–2)
WBC # BLD AUTO: 12.5 10E9/L (ref 4–11)
WBC # BLD AUTO: 8.8 10E9/L (ref 4–11)
WBC #/AREA URNS AUTO: <1 /HPF (ref 0–5)

## 2019-12-18 PROCEDURE — 85025 COMPLETE CBC W/AUTO DIFF WBC: CPT | Performed by: EMERGENCY MEDICINE

## 2019-12-18 PROCEDURE — G0378 HOSPITAL OBSERVATION PER HR: HCPCS

## 2019-12-18 PROCEDURE — 25000128 H RX IP 250 OP 636: Performed by: ANESTHESIOLOGY

## 2019-12-18 PROCEDURE — 86850 RBC ANTIBODY SCREEN: CPT | Performed by: OBSTETRICS & GYNECOLOGY

## 2019-12-18 PROCEDURE — 86900 BLOOD TYPING SEROLOGIC ABO: CPT | Performed by: OBSTETRICS & GYNECOLOGY

## 2019-12-18 PROCEDURE — 71000012 ZZH RECOVERY PHASE 1 LEVEL 1 FIRST HR: Performed by: OBSTETRICS & GYNECOLOGY

## 2019-12-18 PROCEDURE — 36415 COLL VENOUS BLD VENIPUNCTURE: CPT | Performed by: OBSTETRICS & GYNECOLOGY

## 2019-12-18 PROCEDURE — 76830 TRANSVAGINAL US NON-OB: CPT | Mod: 77

## 2019-12-18 PROCEDURE — 99285 EMERGENCY DEPT VISIT HI MDM: CPT | Mod: 25

## 2019-12-18 PROCEDURE — 25000128 H RX IP 250 OP 636: Performed by: PHYSICIAN ASSISTANT

## 2019-12-18 PROCEDURE — 74176 CT ABD & PELVIS W/O CONTRAST: CPT

## 2019-12-18 PROCEDURE — 25000128 H RX IP 250 OP 636: Performed by: NURSE ANESTHETIST, CERTIFIED REGISTERED

## 2019-12-18 PROCEDURE — 96375 TX/PRO/DX INJ NEW DRUG ADDON: CPT

## 2019-12-18 PROCEDURE — 76830 TRANSVAGINAL US NON-OB: CPT

## 2019-12-18 PROCEDURE — 37000008 ZZH ANESTHESIA TECHNICAL FEE, 1ST 30 MIN: Performed by: OBSTETRICS & GYNECOLOGY

## 2019-12-18 PROCEDURE — 27210794 ZZH OR GENERAL SUPPLY STERILE: Performed by: OBSTETRICS & GYNECOLOGY

## 2019-12-18 PROCEDURE — 25000132 ZZH RX MED GY IP 250 OP 250 PS 637: Performed by: PHYSICIAN ASSISTANT

## 2019-12-18 PROCEDURE — 84702 CHORIONIC GONADOTROPIN TEST: CPT

## 2019-12-18 PROCEDURE — 25000128 H RX IP 250 OP 636: Performed by: OBSTETRICS & GYNECOLOGY

## 2019-12-18 PROCEDURE — 25000132 ZZH RX MED GY IP 250 OP 250 PS 637: Performed by: OBSTETRICS & GYNECOLOGY

## 2019-12-18 PROCEDURE — 25000125 ZZHC RX 250: Performed by: OBSTETRICS & GYNECOLOGY

## 2019-12-18 PROCEDURE — 0UN54ZZ RELEASE RIGHT FALLOPIAN TUBE, PERCUTANEOUS ENDOSCOPIC APPROACH: ICD-10-PCS | Performed by: OBSTETRICS & GYNECOLOGY

## 2019-12-18 PROCEDURE — 37000009 ZZH ANESTHESIA TECHNICAL FEE, EACH ADDTL 15 MIN: Performed by: OBSTETRICS & GYNECOLOGY

## 2019-12-18 PROCEDURE — 84702 CHORIONIC GONADOTROPIN TEST: CPT | Performed by: OBSTETRICS & GYNECOLOGY

## 2019-12-18 PROCEDURE — 96376 TX/PRO/DX INJ SAME DRUG ADON: CPT

## 2019-12-18 PROCEDURE — 0UN04ZZ RELEASE RIGHT OVARY, PERCUTANEOUS ENDOSCOPIC APPROACH: ICD-10-PCS | Performed by: OBSTETRICS & GYNECOLOGY

## 2019-12-18 PROCEDURE — 99223 1ST HOSP IP/OBS HIGH 75: CPT | Mod: AI | Performed by: PHYSICIAN ASSISTANT

## 2019-12-18 PROCEDURE — 0UB04ZZ EXCISION OF RIGHT OVARY, PERCUTANEOUS ENDOSCOPIC APPROACH: ICD-10-PCS | Performed by: OBSTETRICS & GYNECOLOGY

## 2019-12-18 PROCEDURE — 12000011 ZZH R&B MS OVERFLOW

## 2019-12-18 PROCEDURE — 96374 THER/PROPH/DIAG INJ IV PUSH: CPT

## 2019-12-18 PROCEDURE — 71000013 ZZH RECOVERY PHASE 1 LEVEL 1 EA ADDTL HR: Performed by: OBSTETRICS & GYNECOLOGY

## 2019-12-18 PROCEDURE — 40000306 ZZH STATISTIC PRE PROC ASSESS II: Performed by: OBSTETRICS & GYNECOLOGY

## 2019-12-18 PROCEDURE — 25800030 ZZH RX IP 258 OP 636: Performed by: ANESTHESIOLOGY

## 2019-12-18 PROCEDURE — 81001 URINALYSIS AUTO W/SCOPE: CPT | Performed by: EMERGENCY MEDICINE

## 2019-12-18 PROCEDURE — 85027 COMPLETE CBC AUTOMATED: CPT | Performed by: OBSTETRICS & GYNECOLOGY

## 2019-12-18 PROCEDURE — 83690 ASSAY OF LIPASE: CPT | Performed by: EMERGENCY MEDICINE

## 2019-12-18 PROCEDURE — 36000056 ZZH SURGERY LEVEL 3 1ST 30 MIN: Performed by: OBSTETRICS & GYNECOLOGY

## 2019-12-18 PROCEDURE — 25000125 ZZHC RX 250: Performed by: NURSE ANESTHETIST, CERTIFIED REGISTERED

## 2019-12-18 PROCEDURE — 36000058 ZZH SURGERY LEVEL 3 EA 15 ADDTL MIN: Performed by: OBSTETRICS & GYNECOLOGY

## 2019-12-18 PROCEDURE — 84702 CHORIONIC GONADOTROPIN TEST: CPT | Performed by: EMERGENCY MEDICINE

## 2019-12-18 PROCEDURE — 85018 HEMOGLOBIN: CPT | Performed by: PHYSICIAN ASSISTANT

## 2019-12-18 PROCEDURE — 36415 COLL VENOUS BLD VENIPUNCTURE: CPT | Performed by: PHYSICIAN ASSISTANT

## 2019-12-18 PROCEDURE — 25800030 ZZH RX IP 258 OP 636: Performed by: EMERGENCY MEDICINE

## 2019-12-18 PROCEDURE — 80053 COMPREHEN METABOLIC PANEL: CPT | Performed by: EMERGENCY MEDICINE

## 2019-12-18 PROCEDURE — 96361 HYDRATE IV INFUSION ADD-ON: CPT

## 2019-12-18 PROCEDURE — 88305 TISSUE EXAM BY PATHOLOGIST: CPT | Mod: 26 | Performed by: OBSTETRICS & GYNECOLOGY

## 2019-12-18 PROCEDURE — 86901 BLOOD TYPING SEROLOGIC RH(D): CPT | Performed by: OBSTETRICS & GYNECOLOGY

## 2019-12-18 PROCEDURE — 88305 TISSUE EXAM BY PATHOLOGIST: CPT | Performed by: OBSTETRICS & GYNECOLOGY

## 2019-12-18 PROCEDURE — 25000128 H RX IP 250 OP 636: Performed by: EMERGENCY MEDICINE

## 2019-12-18 RX ORDER — ONDANSETRON 2 MG/ML
4 INJECTION INTRAMUSCULAR; INTRAVENOUS EVERY 6 HOURS PRN
Status: DISCONTINUED | OUTPATIENT
Start: 2019-12-18 | End: 2019-12-19 | Stop reason: HOSPADM

## 2019-12-18 RX ORDER — LIDOCAINE 40 MG/G
CREAM TOPICAL
Status: DISCONTINUED | OUTPATIENT
Start: 2019-12-18 | End: 2019-12-19 | Stop reason: HOSPADM

## 2019-12-18 RX ORDER — KETOROLAC TROMETHAMINE 15 MG/ML
15 INJECTION, SOLUTION INTRAMUSCULAR; INTRAVENOUS EVERY 6 HOURS PRN
Status: DISCONTINUED | OUTPATIENT
Start: 2019-12-18 | End: 2019-12-19 | Stop reason: HOSPADM

## 2019-12-18 RX ORDER — PROCHLORPERAZINE 25 MG
25 SUPPOSITORY, RECTAL RECTAL EVERY 12 HOURS PRN
Status: DISCONTINUED | OUTPATIENT
Start: 2019-12-18 | End: 2019-12-19 | Stop reason: HOSPADM

## 2019-12-18 RX ORDER — MORPHINE SULFATE 4 MG/ML
4 INJECTION, SOLUTION INTRAMUSCULAR; INTRAVENOUS ONCE
Status: COMPLETED | OUTPATIENT
Start: 2019-12-18 | End: 2019-12-18

## 2019-12-18 RX ORDER — IBUPROFEN 600 MG/1
600 TABLET, FILM COATED ORAL EVERY 6 HOURS PRN
Status: DISCONTINUED | OUTPATIENT
Start: 2019-12-18 | End: 2019-12-19 | Stop reason: HOSPADM

## 2019-12-18 RX ORDER — HYDROMORPHONE HYDROCHLORIDE 1 MG/ML
0.2 INJECTION, SOLUTION INTRAMUSCULAR; INTRAVENOUS; SUBCUTANEOUS ONCE
Status: COMPLETED | OUTPATIENT
Start: 2019-12-18 | End: 2019-12-18

## 2019-12-18 RX ORDER — FENTANYL CITRATE 50 UG/ML
50 INJECTION, SOLUTION INTRAMUSCULAR; INTRAVENOUS
Status: DISCONTINUED | OUTPATIENT
Start: 2019-12-18 | End: 2019-12-18 | Stop reason: HOSPADM

## 2019-12-18 RX ORDER — SODIUM CHLORIDE, SODIUM LACTATE, POTASSIUM CHLORIDE, CALCIUM CHLORIDE 600; 310; 30; 20 MG/100ML; MG/100ML; MG/100ML; MG/100ML
INJECTION, SOLUTION INTRAVENOUS CONTINUOUS
Status: DISCONTINUED | OUTPATIENT
Start: 2019-12-18 | End: 2019-12-18 | Stop reason: HOSPADM

## 2019-12-18 RX ORDER — OXYCODONE HYDROCHLORIDE 5 MG/1
5 TABLET ORAL EVERY 6 HOURS PRN
Qty: 12 TABLET | Refills: 0 | Status: SHIPPED | OUTPATIENT
Start: 2019-12-18 | End: 2019-12-19

## 2019-12-18 RX ORDER — CEFAZOLIN SODIUM 1 G/3ML
1 INJECTION, POWDER, FOR SOLUTION INTRAMUSCULAR; INTRAVENOUS SEE ADMIN INSTRUCTIONS
Status: DISCONTINUED | OUTPATIENT
Start: 2019-12-18 | End: 2019-12-18 | Stop reason: HOSPADM

## 2019-12-18 RX ORDER — PHENAZOPYRIDINE HYDROCHLORIDE 200 MG/1
200 TABLET, FILM COATED ORAL ONCE
Status: COMPLETED | OUTPATIENT
Start: 2019-12-18 | End: 2019-12-18

## 2019-12-18 RX ORDER — KETOROLAC TROMETHAMINE 15 MG/ML
15 INJECTION, SOLUTION INTRAMUSCULAR; INTRAVENOUS ONCE
Status: DISCONTINUED | OUTPATIENT
Start: 2019-12-18 | End: 2019-12-18

## 2019-12-18 RX ORDER — ONDANSETRON 2 MG/ML
4 INJECTION INTRAMUSCULAR; INTRAVENOUS EVERY 30 MIN PRN
Status: DISCONTINUED | OUTPATIENT
Start: 2019-12-18 | End: 2019-12-18 | Stop reason: HOSPADM

## 2019-12-18 RX ORDER — HYDROMORPHONE HYDROCHLORIDE 1 MG/ML
0.2 INJECTION, SOLUTION INTRAMUSCULAR; INTRAVENOUS; SUBCUTANEOUS
Status: DISCONTINUED | OUTPATIENT
Start: 2019-12-18 | End: 2019-12-19 | Stop reason: HOSPADM

## 2019-12-18 RX ORDER — SPIRONOLACTONE 50 MG/1
50 TABLET, FILM COATED ORAL 3 TIMES DAILY
Status: ON HOLD | COMMUNITY
End: 2023-11-08

## 2019-12-18 RX ORDER — ACETAMINOPHEN 325 MG/1
650 TABLET ORAL EVERY 4 HOURS PRN
Status: DISCONTINUED | OUTPATIENT
Start: 2019-12-21 | End: 2019-12-19 | Stop reason: HOSPADM

## 2019-12-18 RX ORDER — KETOROLAC TROMETHAMINE 15 MG/ML
15 INJECTION, SOLUTION INTRAMUSCULAR; INTRAVENOUS EVERY 6 HOURS PRN
Status: DISCONTINUED | OUTPATIENT
Start: 2019-12-18 | End: 2019-12-18

## 2019-12-18 RX ORDER — HYDRALAZINE HYDROCHLORIDE 20 MG/ML
2.5-5 INJECTION INTRAMUSCULAR; INTRAVENOUS EVERY 10 MIN PRN
Status: DISCONTINUED | OUTPATIENT
Start: 2019-12-18 | End: 2019-12-18 | Stop reason: HOSPADM

## 2019-12-18 RX ORDER — NALOXONE HYDROCHLORIDE 0.4 MG/ML
.1-.4 INJECTION, SOLUTION INTRAMUSCULAR; INTRAVENOUS; SUBCUTANEOUS
Status: DISCONTINUED | OUTPATIENT
Start: 2019-12-18 | End: 2019-12-18

## 2019-12-18 RX ORDER — FENTANYL CITRATE 50 UG/ML
25-50 INJECTION, SOLUTION INTRAMUSCULAR; INTRAVENOUS
Status: DISCONTINUED | OUTPATIENT
Start: 2019-12-18 | End: 2019-12-18 | Stop reason: HOSPADM

## 2019-12-18 RX ORDER — CEFAZOLIN SODIUM 2 G/100ML
2 INJECTION, SOLUTION INTRAVENOUS
Status: COMPLETED | OUTPATIENT
Start: 2019-12-18 | End: 2019-12-18

## 2019-12-18 RX ORDER — NALOXONE HYDROCHLORIDE 0.4 MG/ML
.1-.4 INJECTION, SOLUTION INTRAMUSCULAR; INTRAVENOUS; SUBCUTANEOUS
Status: DISCONTINUED | OUTPATIENT
Start: 2019-12-18 | End: 2019-12-19 | Stop reason: HOSPADM

## 2019-12-18 RX ORDER — KETOROLAC TROMETHAMINE 15 MG/ML
15 INJECTION, SOLUTION INTRAMUSCULAR; INTRAVENOUS ONCE
Status: COMPLETED | OUTPATIENT
Start: 2019-12-18 | End: 2019-12-18

## 2019-12-18 RX ORDER — ONDANSETRON 2 MG/ML
4 INJECTION INTRAMUSCULAR; INTRAVENOUS
Status: COMPLETED | OUTPATIENT
Start: 2019-12-18 | End: 2019-12-18

## 2019-12-18 RX ORDER — DEXTROSE, SODIUM CHLORIDE, SODIUM LACTATE, POTASSIUM CHLORIDE, AND CALCIUM CHLORIDE 5; .6; .31; .03; .02 G/100ML; G/100ML; G/100ML; G/100ML; G/100ML
INJECTION, SOLUTION INTRAVENOUS CONTINUOUS
Status: DISCONTINUED | OUTPATIENT
Start: 2019-12-18 | End: 2019-12-19

## 2019-12-18 RX ORDER — ACETAMINOPHEN 325 MG/1
975 TABLET ORAL EVERY 8 HOURS
Status: DISCONTINUED | OUTPATIENT
Start: 2019-12-18 | End: 2019-12-19 | Stop reason: HOSPADM

## 2019-12-18 RX ORDER — BUPIVACAINE HYDROCHLORIDE AND EPINEPHRINE 2.5; 5 MG/ML; UG/ML
INJECTION, SOLUTION EPIDURAL; INFILTRATION; INTRACAUDAL; PERINEURAL PRN
Status: DISCONTINUED | OUTPATIENT
Start: 2019-12-18 | End: 2019-12-18 | Stop reason: HOSPADM

## 2019-12-18 RX ORDER — DIMENHYDRINATE 50 MG/ML
25 INJECTION, SOLUTION INTRAMUSCULAR; INTRAVENOUS
Status: DISCONTINUED | OUTPATIENT
Start: 2019-12-18 | End: 2019-12-18 | Stop reason: HOSPADM

## 2019-12-18 RX ORDER — AMOXICILLIN 250 MG
1 CAPSULE ORAL 2 TIMES DAILY PRN
Status: DISCONTINUED | OUTPATIENT
Start: 2019-12-18 | End: 2019-12-19 | Stop reason: HOSPADM

## 2019-12-18 RX ORDER — PROPOFOL 10 MG/ML
INJECTION, EMULSION INTRAVENOUS PRN
Status: DISCONTINUED | OUTPATIENT
Start: 2019-12-18 | End: 2019-12-18

## 2019-12-18 RX ORDER — GLYCOPYRROLATE 0.2 MG/ML
INJECTION, SOLUTION INTRAMUSCULAR; INTRAVENOUS PRN
Status: DISCONTINUED | OUTPATIENT
Start: 2019-12-18 | End: 2019-12-18

## 2019-12-18 RX ORDER — LIDOCAINE HYDROCHLORIDE 10 MG/ML
INJECTION, SOLUTION INFILTRATION; PERINEURAL PRN
Status: DISCONTINUED | OUTPATIENT
Start: 2019-12-18 | End: 2019-12-18

## 2019-12-18 RX ORDER — HYDROMORPHONE HYDROCHLORIDE 1 MG/ML
.3-.5 INJECTION, SOLUTION INTRAMUSCULAR; INTRAVENOUS; SUBCUTANEOUS EVERY 5 MIN PRN
Status: DISCONTINUED | OUTPATIENT
Start: 2019-12-18 | End: 2019-12-18 | Stop reason: HOSPADM

## 2019-12-18 RX ORDER — DEXAMETHASONE SODIUM PHOSPHATE 4 MG/ML
INJECTION, SOLUTION INTRA-ARTICULAR; INTRALESIONAL; INTRAMUSCULAR; INTRAVENOUS; SOFT TISSUE PRN
Status: DISCONTINUED | OUTPATIENT
Start: 2019-12-18 | End: 2019-12-18

## 2019-12-18 RX ORDER — OXYCODONE HYDROCHLORIDE 5 MG/1
5-10 TABLET ORAL
Status: DISCONTINUED | OUTPATIENT
Start: 2019-12-18 | End: 2019-12-19 | Stop reason: HOSPADM

## 2019-12-18 RX ORDER — AMOXICILLIN 250 MG
2 CAPSULE ORAL 2 TIMES DAILY PRN
Status: DISCONTINUED | OUTPATIENT
Start: 2019-12-18 | End: 2019-12-19 | Stop reason: HOSPADM

## 2019-12-18 RX ORDER — PROPOFOL 10 MG/ML
INJECTION, EMULSION INTRAVENOUS CONTINUOUS PRN
Status: DISCONTINUED | OUTPATIENT
Start: 2019-12-18 | End: 2019-12-18

## 2019-12-18 RX ORDER — PROCHLORPERAZINE MALEATE 10 MG
10 TABLET ORAL EVERY 6 HOURS PRN
Status: DISCONTINUED | OUTPATIENT
Start: 2019-12-18 | End: 2019-12-19 | Stop reason: HOSPADM

## 2019-12-18 RX ORDER — POLYETHYLENE GLYCOL 3350 17 G/17G
17 POWDER, FOR SOLUTION ORAL DAILY PRN
Status: DISCONTINUED | OUTPATIENT
Start: 2019-12-18 | End: 2019-12-19

## 2019-12-18 RX ORDER — MULTIPLE VITAMINS W/ MINERALS TAB 9MG-400MCG
1 TAB ORAL DAILY
COMMUNITY

## 2019-12-18 RX ORDER — NEOSTIGMINE METHYLSULFATE 1 MG/ML
VIAL (ML) INJECTION PRN
Status: DISCONTINUED | OUTPATIENT
Start: 2019-12-18 | End: 2019-12-18

## 2019-12-18 RX ORDER — ONDANSETRON 4 MG/1
4 TABLET, ORALLY DISINTEGRATING ORAL EVERY 30 MIN PRN
Status: DISCONTINUED | OUTPATIENT
Start: 2019-12-18 | End: 2019-12-18 | Stop reason: HOSPADM

## 2019-12-18 RX ADMIN — HYDROMORPHONE HYDROCHLORIDE 0.2 MG: 1 INJECTION, SOLUTION INTRAMUSCULAR; INTRAVENOUS; SUBCUTANEOUS at 15:30

## 2019-12-18 RX ADMIN — KETOROLAC TROMETHAMINE 15 MG: 15 INJECTION, SOLUTION INTRAMUSCULAR; INTRAVENOUS at 23:49

## 2019-12-18 RX ADMIN — MIDAZOLAM 2 MG: 1 INJECTION INTRAMUSCULAR; INTRAVENOUS at 17:46

## 2019-12-18 RX ADMIN — PHENAZOPYRIDINE 200 MG: 200 TABLET ORAL at 17:40

## 2019-12-18 RX ADMIN — ROCURONIUM BROMIDE 30 MG: 10 INJECTION INTRAVENOUS at 17:48

## 2019-12-18 RX ADMIN — PROPOFOL 75 MCG/KG/MIN: 10 INJECTION, EMULSION INTRAVENOUS at 17:55

## 2019-12-18 RX ADMIN — SODIUM CHLORIDE, POTASSIUM CHLORIDE, SODIUM LACTATE AND CALCIUM CHLORIDE: 600; 310; 30; 20 INJECTION, SOLUTION INTRAVENOUS at 17:46

## 2019-12-18 RX ADMIN — FENTANYL CITRATE 50 MCG: 50 INJECTION, SOLUTION INTRAMUSCULAR; INTRAVENOUS at 17:15

## 2019-12-18 RX ADMIN — DEXAMETHASONE SODIUM PHOSPHATE 4 MG: 4 INJECTION, SOLUTION INTRA-ARTICULAR; INTRALESIONAL; INTRAMUSCULAR; INTRAVENOUS; SOFT TISSUE at 17:48

## 2019-12-18 RX ADMIN — OXYCODONE HYDROCHLORIDE 5 MG: 5 TABLET ORAL at 22:38

## 2019-12-18 RX ADMIN — ACETAMINOPHEN 975 MG: 325 TABLET, FILM COATED ORAL at 21:25

## 2019-12-18 RX ADMIN — ONDANSETRON HYDROCHLORIDE 4 MG: 2 INJECTION, SOLUTION INTRAMUSCULAR; INTRAVENOUS at 00:50

## 2019-12-18 RX ADMIN — SODIUM CHLORIDE, POTASSIUM CHLORIDE, SODIUM LACTATE AND CALCIUM CHLORIDE: 600; 310; 30; 20 INJECTION, SOLUTION INTRAVENOUS at 19:24

## 2019-12-18 RX ADMIN — HYDROMORPHONE HYDROCHLORIDE 0.2 MG: 1 INJECTION, SOLUTION INTRAMUSCULAR; INTRAVENOUS; SUBCUTANEOUS at 06:54

## 2019-12-18 RX ADMIN — FENTANYL CITRATE 50 MCG: 50 INJECTION, SOLUTION INTRAMUSCULAR; INTRAVENOUS at 19:42

## 2019-12-18 RX ADMIN — HYDROMORPHONE HYDROCHLORIDE 0.2 MG: 1 INJECTION, SOLUTION INTRAMUSCULAR; INTRAVENOUS; SUBCUTANEOUS at 01:50

## 2019-12-18 RX ADMIN — HYDROMORPHONE HYDROCHLORIDE 0.2 MG: 1 INJECTION, SOLUTION INTRAMUSCULAR; INTRAVENOUS; SUBCUTANEOUS at 00:49

## 2019-12-18 RX ADMIN — KETOROLAC TROMETHAMINE 15 MG: 15 INJECTION, SOLUTION INTRAMUSCULAR; INTRAVENOUS at 01:17

## 2019-12-18 RX ADMIN — Medication 1 LOZENGE: at 22:05

## 2019-12-18 RX ADMIN — KETOROLAC TROMETHAMINE 15 MG: 15 INJECTION, SOLUTION INTRAMUSCULAR; INTRAVENOUS at 10:56

## 2019-12-18 RX ADMIN — FENTANYL CITRATE 100 MCG: 50 INJECTION, SOLUTION INTRAMUSCULAR; INTRAVENOUS at 17:48

## 2019-12-18 RX ADMIN — SODIUM CHLORIDE, SODIUM LACTATE, POTASSIUM CHLORIDE, CALCIUM CHLORIDE AND DEXTROSE MONOHYDRATE: 5; 600; 310; 30; 20 INJECTION, SOLUTION INTRAVENOUS at 15:36

## 2019-12-18 RX ADMIN — Medication 3 MG: at 18:40

## 2019-12-18 RX ADMIN — SODIUM CHLORIDE, SODIUM LACTATE, POTASSIUM CHLORIDE, CALCIUM CHLORIDE AND DEXTROSE MONOHYDRATE: 5; 600; 310; 30; 20 INJECTION, SOLUTION INTRAVENOUS at 06:54

## 2019-12-18 RX ADMIN — ONDANSETRON 4 MG: 2 INJECTION INTRAMUSCULAR; INTRAVENOUS at 17:48

## 2019-12-18 RX ADMIN — LIDOCAINE HYDROCHLORIDE 50 MG: 10 INJECTION, SOLUTION INFILTRATION; PERINEURAL at 17:48

## 2019-12-18 RX ADMIN — HYDROMORPHONE HYDROCHLORIDE 0.2 MG: 1 INJECTION, SOLUTION INTRAMUSCULAR; INTRAVENOUS; SUBCUTANEOUS at 13:30

## 2019-12-18 RX ADMIN — HYDROMORPHONE HYDROCHLORIDE 0.2 MG: 1 INJECTION, SOLUTION INTRAMUSCULAR; INTRAVENOUS; SUBCUTANEOUS at 04:04

## 2019-12-18 RX ADMIN — CEFAZOLIN SODIUM 2 G: 2 INJECTION, SOLUTION INTRAVENOUS at 17:46

## 2019-12-18 RX ADMIN — GLYCOPYRROLATE 0.4 MG: 0.2 INJECTION, SOLUTION INTRAMUSCULAR; INTRAVENOUS at 18:40

## 2019-12-18 RX ADMIN — SODIUM CHLORIDE 1000 ML: 9 INJECTION, SOLUTION INTRAVENOUS at 00:50

## 2019-12-18 RX ADMIN — MORPHINE SULFATE 4 MG: 4 INJECTION INTRAVENOUS at 01:17

## 2019-12-18 RX ADMIN — PROPOFOL 200 MG: 10 INJECTION, EMULSION INTRAVENOUS at 17:48

## 2019-12-18 RX ADMIN — GLYCOPYRROLATE 0.2 MG: 0.2 INJECTION, SOLUTION INTRAMUSCULAR; INTRAVENOUS at 17:48

## 2019-12-18 ASSESSMENT — COPD QUESTIONNAIRES: COPD: 0

## 2019-12-18 ASSESSMENT — MIFFLIN-ST. JEOR
SCORE: 1380.45
SCORE: 1380.45

## 2019-12-18 ASSESSMENT — ENCOUNTER SYMPTOMS
DYSRHYTHMIAS: 0
FLANK PAIN: 1
SEIZURES: 0
DIARRHEA: 0
VOMITING: 1
HEMATURIA: 0
ABDOMINAL PAIN: 1
STRIDOR: 0
BACK PAIN: 1
NAUSEA: 1

## 2019-12-18 ASSESSMENT — LIFESTYLE VARIABLES: TOBACCO_USE: 0

## 2019-12-18 NOTE — PLAN OF CARE
PRIMARY DIAGNOSIS: ACUTE PAIN  OUTPATIENT/OBSERVATION GOALS TO BE MET BEFORE DISCHARGE:  1. Pain Status: Improved but still requiring IV narcotics.    2. Return to near baseline physical activity: Yes    3. Cleared for discharge by consultants (if involved): No    Discharge Planner Nurse   Safe discharge environment identified: Yes  Barriers to discharge: No       Entered by: Luz Darby 12/18/2019 1:09 PM     Please review provider order for any additional goals.   Nurse to notify provider when observation goals have been met and patient is ready for discharge.    Pt is alert and oriented. States pain is 7/10 after US, Toradol given. Pt has results back from US, OB MD paged.

## 2019-12-18 NOTE — ED NOTES
[facility]  ED Nurse Handoff Report    Hailey Faria is a 24 year old female   ED Chief complaint: Flank Pain  . ED Diagnosis:   Final diagnoses:   Abdominal pain, right lower quadrant   Cyst of right ovary     Allergies: No Known Allergies    Code Status: not on file  Activity level - Baseline/Home:  Independent. Activity Level - Current:   Independent. Lift room needed: No. Bariatric: No   Needed: No   Isolation: No. Infection: Not Applicable.     Vital Signs:   Vitals:    12/18/19 0015 12/18/19 0218 12/18/19 0330   BP: 92/79 102/52 100/55   Pulse:   75   Resp: 18     Temp: 98.4  F (36.9  C)     SpO2: 100% 96% 96%   Weight: 64 kg (141 lb 1.5 oz)         Cardiac Rhythm:  ,      Pain level: 0-10 Pain Scale: 5  Patient confused: No. Patient Falls Risk: Yes.  R/t pain meds  Elimination Status: Has voided   Patient Report - Initial Complaint: Flank/lower abd pain. Focused Assessment: Started having an achy lower right back today, tonight had sudden onset of RLQ pain with nausea and vomiting x2.  Pain difficult to control with pain meds.  No vomiting in ED.  Very tender.  Non distended.  Bowel sounds WDL.   Tests Performed:   Labs Ordered and Resulted from Time of ED Arrival Up to the Time of Departure from the ED   CBC WITH PLATELETS DIFFERENTIAL - Abnormal; Notable for the following components:       Result Value    WBC 12.5 (*)      (*)     Absolute Neutrophil 9.9 (*)     All other components within normal limits   COMPREHENSIVE METABOLIC PANEL - Abnormal; Notable for the following components:    Glucose 109 (*)     Urea Nitrogen 4 (*)     Creatinine 0.48 (*)     Albumin 2.5 (*)     Protein Total 5.7 (*)     All other components within normal limits   ROUTINE UA WITH MICROSCOPIC - Abnormal; Notable for the following components:    Mucous Urine Present (*)     All other components within normal limits   ISTAT HCG QUANTITATIVE PREGNANCY POCT - Abnormal; Notable for the following  components:    HCG Quantitative Serum 13.9 (*)     All other components within normal limits   LIPASE   HCG QUANTITATIVE PREGNANCY   ISTAT HCG QUANTITATIVE PREGNANCY NURSING POCT     Abd/pelvis CT no contrast - Stone Protocol   Final Result   IMPRESSION:    1.  Cystic area in the pelvis should correspond to the right ovarian cyst seen on ultrasound. It measures slightly larger but this likely includes a small amount of adjacent pelvic fluid.   2.  No urinary stones, hydronephrosis or other acute findings.      US Pelvic Complete w Transvaginal & Abd/Pel Duplex Limited   Final Result   IMPRESSION:     1.  3.7 cm simple cyst/dominant follicle right ovary and a small amount of free fluid.   2.  Otherwise negative.                   Treatments provided: See MAR, hot packs  Family Comments: mom at bedside, S/O left for the night  OBS brochure/video discussed/provided to patient:  Yes  ED Medications:   Medications   ondansetron (ZOFRAN) injection 4 mg (4 mg Intravenous Given 12/18/19 0050)   0.9% sodium chloride BOLUS (0 mLs Intravenous Stopped 12/18/19 0404)   HYDROmorphone (PF) (DILAUDID) injection 0.2 mg (0.2 mg Intravenous Given 12/18/19 0049)   morphine (PF) injection 4 mg (4 mg Intravenous Given 12/18/19 0117)   ketorolac (TORADOL) injection 15 mg (15 mg Intravenous Given 12/18/19 0117)   HYDROmorphone (PF) (DILAUDID) injection 0.2 mg (0.2 mg Intravenous Given 12/18/19 0150)   HYDROmorphone (PF) (DILAUDID) injection 0.2 mg (0.2 mg Intravenous Given 12/18/19 0404)     Drips infusing:  No  For the majority of the shift, the patient's behavior Green. Interventions performed were rounding     Severe Sepsis OR Septic Shock Diagnosis Present: No      ED Nurse Name/Phone Number: Jennifer Reddy RN,   4:13 AM    RECEIVING UNIT ED HANDOFF REVIEW    Above ED Nurse Handoff Report was reviewed: Yes  Reviewed by: Gayathri Batista RN on December 18, 2019 at 6:25 AM

## 2019-12-18 NOTE — H&P (VIEW-ONLY)
History     Chief Complaint:  Flank Pain     HPI   Hailey Faria is a 24 year old female who presents with flank pain. The patient reports that earlier this evening she had been experiencing right sided back pain that was described as an ache. Later in the evening, she developed acute worsening pain radiating from her back to her flank and into her abdomen. She notes that the pain also will radiate down her leg and the pain has been constant since. She has had associated symptoms of nausea and vomiting. The patient denies hematuria or other urinary symptoms, vaginal bleeding, diarrhea, or recent falls, injuries, or heavy lifiting. The patient's most recent menstrual cycle was 1 month ago. She recalls a history of ovarian cysts, however, has not had any issues since having the Mirena removed. There is no personal or family history of kidney stones.  Last BM earlier today.     Allergies:  No known drug allergies.       Medications:    Flonase  Aldactone     Past Medical History:    Anemia   Rectal bleeding  Ovarian cyst   Fatigue     Past Surgical History:    Dental surgery   IR mesenteric artery angiogram   Dubberly tooth extraction     Family History:    Family history reviewed. No pertinent family history.     Social History:  The patient was accompanied to the ED by mother and boyfriend.  Smoking Status: Never Smoker  Smokeless Tobacco: Never Used  Alcohol Use: Positive  Drug Use: Negative  PCP: Destiny Alvarado   Marital Status:  Single     Review of Systems   Gastrointestinal: Positive for abdominal pain, nausea and vomiting. Negative for diarrhea.   Genitourinary: Positive for flank pain. Negative for hematuria and vaginal bleeding.   Musculoskeletal: Positive for back pain.        Radiating leg pain   All other systems reviewed and are negative.    Physical Exam     Patient Vitals for the past 24 hrs:   BP Temp Pulse Heart Rate Resp SpO2 Weight   12/18/19 0400 100/56 -- 73 -- -- 97 % --    12/18/19 0330 100/55 -- 75 -- -- 96 % --   12/18/19 0218 102/52 -- -- -- -- 96 % --   12/18/19 0015 92/79 98.4  F (36.9  C) -- 64 18 100 % 64 kg (141 lb 1.5 oz)      Physical Exam    General:              Well-nourished              Speaking in full sentences  Eyes:              Conjunctiva without injection or scleral icterus  ENT:              Moist mucous membranes              Nares patent              Pinnae normal  Neck:              Full ROM              No stiffness appreciated  Resp:              Lungs CTAB              No crackles, wheezing or audible rubs              Good air movement  CV:                    Normal rate, regular rhythm              S1 and S2 present              No murmur, gallop or rub  GI:              BS present              Abdomen soft without distention              Tenderness to palpation to deep lower right pelvis   No notable pain over McBurney's point              No guarding or rebound tenderness   No CVA tenderness  Skin:              Warm, dry, well perfused              No rashes or open wounds on exposed skin   No vesicular lesions  MSK:              Moves all extremities              No focal deformities or swelling  Neuro:              Alert              Answers questions appropriately              Moves all extremities equally              Gait stable  Psych:              Normal affect, normal mood    Emergency Department Course     Imaging:  Radiology findings were communicated with the patient who voiced understanding of the findings.    Abd/pelvis CT no contrast - Stone Protocol  1.  Cystic area in the pelvis should correspond to the right ovarian cyst seen on ultrasound. It measures slightly larger but this likely includes a small amount of adjacent pelvic fluid.  2.  No urinary stones, hydronephrosis or other acute findings.  Reading per radiology    US Pelvic Complete w Transvaginal & Abd/Pel Duplex Limited  1.  3.7 cm simple cyst/dominant follicle right ovary  and a small amount of free fluid.  2.  Otherwise negative.  Reading per radiology      Laboratory:  Laboratory findings were communicated with the patient who voiced understanding of the findings.    UA: Mucous present (A), o/w WNL.     CBC: WBC 12.5 (H), HGB 14.0,   CMP: Glucose 109 (H), BUN 4 (L), o/w WNL (Creatinine 0.48 (L))    Lipase: 105    HCG Quantitative: <1  ISTAT HCG Quantitative: 13.9 (H)     Interventions:  0049 Dilaudid 0.2 mg IV  0050 Zofran 4 mg IV  0050 NS 1000 mL IV   0117 morphine 4 mg IV  0117 Dilaudid 0.2 mg IV  0404 Dilaudid 0.2 mg IV    Emergency Department Course:    0031 Nursing notes and vitals reviewed. I performed an exam of the patient as documented above. IV was inserted and blood was drawn for laboratory testing, results above.     0057 The patient was sent for a US while in the emergency department, results above.      0137 Patient rechecked and updated.      0157 The patient provided a urine sample here in the emergency department. This was sent for laboratory testing, findings above.     0223 The patient was sent for a CT while in the emergency department, results above.      0301 I spoke with Dr. Ghosh of the radiology service regarding patient's presentation, findings, and plan of care.     0310 Patient rechecked and updated.      0340 Patient rechecked and updated.      0431 I spoke with Dr. Drew of the hospitalist service from [facility] regarding patient's presentation, findings, and plan of care.     0433 I spoke with Dr. Sorenson of the OB/GYN service from [facility] regarding patient's presentation, findings, and plan of care.     0444 Patient rechecked and updated.      0557 I spoke with Dr. Sorenson of the OB/GYN service from [facility] regarding patient's presentation, findings, and plan of care.     0700 I spoke with Dr. Drew of the hospitalist service from [facility] regarding patient's presentation, findings, and plan of care.     0705 I spoke  with Dr. Sorenson of the OB/GYN service from [facility] regarding patient's presentation, findings, and plan of care.    Prior to admission, I personally reviewed the results with the patient and all related questions were answered. The patient verbalized understanding and is amenable to plan.     Impression & Plan      Medical Decision Making:  Hailey Faria is a 24 yr old F presenting to the ER for evaluation of flank pain.  VS on presentation reveal BP of 92/79, though are otherwise unremarkable.  DDx is broad, including though is not limited to, ectopic pregnancy, ovarian process (cyst, cyst rupture, torsion), acute appendicitis, ureteral stone, UTI, pyelonephritis, colitis, among others.  Initial iSTAT HCG returned at 13, though formal serum quantitative HCG is negative.  Given acute onset of pain, previous hx of ovarian cysts, initial evaluation included pelvic US, which demonstrates 3.7 cm simple cyst/dominant follicle to the right ovary and a small amount of free fluid, though is otherwise negative.  Pt notes a previous history of ovarian cyst, involving the right ovary, and given ongoing pain, alternative pathology also considered, prompting CT abdomen/pelvis.  CT again confirms a cystic area to the pelvis above the right ovary with adjacent pelvic fluid.  Fortunately, no evidence of urinary stone, or hydronephrosis, or other acute findings are noted.  I reviewed the CT with Dr. Ghosh of radiology, who felt he could identify the appendix, and noted it to be normal in size.  We discussed repeat imaging with contrast, though he did not feel his ability to visualize the appendix would change significantly.  Clinically, patient symptoms seem less consistent with acute appendicitis as well given the rapid onset of her pain.  Labs demonstrate mild leukocytosis with a WBC count of 12.5.  Overall, her symptoms are suspicious for pain related to ovarian cyst and possible cyst rupture, with associated fluid in  the lower pelvis causing pain.  I strongly considered ovarian torsion as patient's pain was fairly persistent despite high doses of IV analgesia.  For that reason, I consulted with Dr. Sorenson from OB/GYN, who is graciously presented to the patient's bedside for further evaluation.  Please refer to her associated documentation.  At this time, suspicion for ovarian torsion is felt to be low, and blood flow is noted bilaterally, though given ongoing pain and discomfort, patient will be admitted to the hospital for observation, close monitoring of her symptoms, and plan for repeat ultrasound and hemoglobin.  We discussed consideration of repeat CT imaging with contrast if symptoms persist or worsen if during patient's observation, although again at this point, her symptoms do seem less likely to be secondary to acute appendicitis given the acute onset of pain, as well as pain within the deeper pelvis.  Her abdominal exam is actually quite soft, without pain over McBurney's point.  Plan of care was discussed with the patient and family at bedside.  They are in agreement with proposed plan of care.  Questions were answered prior to admission.  Symptoms are under improved control following the above interventions.    Diagnosis:    ICD-10-CM    1. Abdominal pain, right lower quadrant R10.31    2. Cyst of right ovary N83.201      Disposition:   The patient is admitted to the hospitalist service.     Scribe Disclosure:  I, Orla Severson, am serving as a scribe at 12:30 AM on 12/18/2019 to document services personally performed by Dannie Luke MD based on my observations and the provider's statements to me.    [facility] EMERGENCY DEPARTMENT       Dannie Luke MD  12/18/19 5169

## 2019-12-18 NOTE — INTERVAL H&P NOTE
I discussed the proposed surgical procedure with the patient and her parents again in the pre-induction area.  The patient continues to require IV Dilaudid every 2 hours or so for the pain.  Her labs from this afternoon returned with a negative quantitative HCG, stable hemoglobin of 12.2.  We discussed the pros, cons, risks and benefits of the proposed procedure including the risk of anesthesia, bleeding, transfusion, infection, injury to normal structures including but not limited to the bowel, bladder, ureters, blood vessels and nerves.  We will plan overnight observational care and likely discharge home tomorrow.  All questions were answered.  Appropriate consent forms were signed.  She is aware that a laparoscopy, pelviscopy, possible ovarian cystectomy, possible oophorectomy or salpingectomy may be necessary.  The patients future fertility will be foremost in our surgical plan.

## 2019-12-18 NOTE — ANESTHESIA PREPROCEDURE EVALUATION
Anesthesia Pre-Procedure Evaluation    Patient: Hailey Faria   MRN: [id number] : 1995          Preoperative Diagnosis: * No pre-op diagnosis entered *    Procedure(s):  Laparoscopy, Right Cystectomy,possible Right Oophorectomy    Past Medical History:   Diagnosis Date     Anemia      Fatigue      Ovarian cyst      Rectal bleeding      Past Surgical History:   Procedure Laterality Date     DENTAL SURGERY       HC ANGIOGRAPHY VISCERAL SELECT W/WO AORTOGRAM       tooth extraction      [place] teet     Anesthesia Evaluation     . Pt has had prior anesthetic. Type: General    No history of anesthetic complications          ROS/MED HX    ENT/Pulmonary:  - neg pulmonary ROS    (-) tobacco use, asthma, COPD, EMELI risk factors and recent URI   Neurologic:  - neg neurologic ROS    (-) seizures and CVA   Cardiovascular:  - neg cardiovascular ROS      (-) hypertension, CAD, arrhythmias and valvular problems/murmurs   METS/Exercise Tolerance:     Hematologic: Comments: Lab Test        19                       1426          1000          0031          1504          WBC          8.8           --          12.5*        3.3*          HGB          12.2         11.8         14.0         10.4*         MCV          100           --          102*         86            PLT          221           --          236          232            Lab Test        19                       0031          1504                            NA           138          141          140           POTASSIUM    3.8          3.7          3.8           CHLORIDE     107          107          104           CO2          28           25            --           BUN          4*           11           16            CR           0.48*        0.58         0.67          ANIONGAP     3            9             --           MARY          8.5          9.4          9.0           GLC           109*         93           93           - neg hematologic  ROS       Musculoskeletal:  - neg musculoskeletal ROS       GI/Hepatic:     (+) Other GI/Hepatic rectal bleeding     (-) GERD   Renal/Genitourinary:  - ROS Renal section negative       Endo:  - neg endo ROS    (-) Type I DM, Type II DM, thyroid disease, chronic steroid usage and obesity   Psychiatric:  - neg psychiatric ROS       Infectious Disease:  - neg infectious disease ROS       Malignancy:      - no malignancy   Other:    - neg other ROS                      Physical Exam  Normal systems: cardiovascular, pulmonary and dental    Airway   Mallampati: I  TM distance: >3 FB  Neck ROM: full    Dental     Cardiovascular   Rhythm and rate: regular and normal  (-) no friction rub, no systolic click and no murmur    Pulmonary    breath sounds clear to auscultation(-) no rhonchi, no decreased breath sounds, no wheezes, no rales and no stridor            Lab Results   Component Value Date    WBC 8.8 12/18/2019    HGB 12.2 12/18/2019    HCT 37.4 12/18/2019     12/18/2019    CRP 0.3 06/27/2014     12/18/2019    POTASSIUM 3.8 12/18/2019    CHLORIDE 107 12/18/2019    CO2 28 12/18/2019    BUN 4 (L) 12/18/2019    CR 0.48 (L) 12/18/2019     (H) 12/18/2019    MARY 8.5 12/18/2019    ALBUMIN 2.5 (L) 12/18/2019    PROTTOTAL 5.7 (L) 12/18/2019    ALT 32 12/18/2019    AST 39 12/18/2019    ALKPHOS 66 12/18/2019    BILITOTAL 0.4 12/18/2019    LIPASE 105 12/18/2019    TSH 1.39 07/19/2017    T4 1.04 06/27/2014       Preop Vitals  BP Readings from Last 3 Encounters:   12/18/19 113/75   10/01/18 100/60   01/09/18 110/70    Pulse Readings from Last 3 Encounters:   12/18/19 71   10/01/18 62   01/09/18 57      Resp Readings from Last 3 Encounters:   12/18/19 20   10/01/18 16   01/09/18 16    SpO2 Readings from Last 3 Encounters:   12/18/19 98%   10/01/18 100%   01/09/18 96%      Temp Readings from Last 1 Encounters:   12/18/19 97.6  F (36.4  C) (Temporal)    Ht  "Readings from Last 1 Encounters:   12/18/19 1.702 m (5' 7\")      Wt Readings from Last 1 Encounters:   12/18/19 60.8 kg (134 lb)    Estimated body mass index is 20.99 kg/m  as calculated from the following:    Height as of this encounter: 1.702 m (5' 7\").    Weight as of this encounter: 60.8 kg (134 lb).       Anesthesia Plan      History & Physical Review  History and physical reviewed and following examination; no interval change.    ASA Status:  2 .    NPO Status:  > 8 hours    Plan for General and ETT with Intravenous induction. Maintenance will be Balanced.    PONV prophylaxis:  Ondansetron (or other 5HT-3) and Dexamethasone or Solumedrol       Postoperative Care  Postoperative pain management:  IV analgesics.      Consents  Anesthetic plan, risks, benefits and alternatives discussed with:  Patient or representative and Patient..                 Taj Gallardo MD                    .  "

## 2019-12-18 NOTE — PROGRESS NOTES
GYN progress note    Subjective:  The patient continues to have right sided lower abdominal discomfort, even at rest. She reports that the pain also radiates to the upper lower extremity, her right hip and lower back.  She has not had any additional or ongoing nausea or vomiting.  She is voiding without difficulty.  The patient remains NPO with IV fluid infusing.     In terms of additional history, the patient reports that she did have a right sided ovarian cyst at the time of the removal of her Mirena IUD nearly one year ago.     Objective:  Temp 97.6 F   Pulse  62   BP 98/52  RR  16     Abdominal exam:  The abdomen is soft and flat.  There is some tenderness to deep palpation in the right lower quadrant.  No acute rebound pain.  No palpable abnormality.     Extremities:  No leg or calf pain    Labs:  Hemoglobin pending to be done at 1000 hours today    Follow up pelvic ultrasound is scheduled this morning       Assessment:  Stable clinical status.  Suspected symptomatic partially ruptured right ovarian cyst.  No evidence of GI,  or pregnancy related complication.  There was no evidence of torsion on the pelvic ultrasound late last evening.        Plan:  Continue NPO status for now until further evaluation this morning              Pelvic ultrasound this morning to re-evaluate the right ovarian cyst, presence of free pelvic fluid and doppler assessment for torsion.              Repeat hemoglobin this morning to evaluate for a precipitous drop  (anticipate some decrease related to intravenous hydration), which could indicate intra-abdominal hemorrhage.              Analgesia as necessary.             If the patient remains stable and the imaging and hemoglobin findings are reassuring, consider transition to oral analgesics.               Advance diet as tolerated later this afternoon             If the patient tolerates oral analgesics and oral alimentation and her vital signs remain stable, discharge later  today.             Recommend outpatient follow up and consideration of additional follow up ultrasound (s) and clinical course to determine if surgical intervention for this ovarian cyst may be inevitable.               The plan of care was discussed with the patient and her mother. They expressed understanding and agreement.

## 2019-12-18 NOTE — LETTER
[facility] OBSERVATION DEPARTMENT  [address]  [facility] [address]  [phone]    2019    Re: Hailey Faria  [address] [facility] [address]  [phone] (home)     : 1995      To Whom It May Concern:      Hailey Faria was hospitalized from 2019 until 2019 due to medical illness requiring surgery.      Sincerely,        Alva Morejon PA-C

## 2019-12-18 NOTE — ED PROVIDER NOTES
History     Chief Complaint:  Flank Pain     HPI   Hailey Faria is a 24 year old female who presents with flank pain. The patient reports that earlier this evening she had been experiencing right sided back pain that was described as an ache. Later in the evening, she developed acute worsening pain radiating from her back to her flank and into her abdomen. She notes that the pain also will radiate down her leg and the pain has been constant since. She has had associated symptoms of nausea and vomiting. The patient denies hematuria or other urinary symptoms, vaginal bleeding, diarrhea, or recent falls, injuries, or heavy lifiting. The patient's most recent menstrual cycle was 1 month ago. She recalls a history of ovarian cysts, however, has not had any issues since having the Mirena removed. There is no personal or family history of kidney stones.  Last BM earlier today.     Allergies:  No known drug allergies.       Medications:    Flonase  Aldactone     Past Medical History:    Anemia   Rectal bleeding  Ovarian cyst   Fatigue     Past Surgical History:    Dental surgery   IR mesenteric artery angiogram   [place] tooth extraction     Family History:    Family history reviewed. No pertinent family history.     Social History:  The patient was accompanied to the ED by mother and boyfriend.  Smoking Status: Never Smoker  Smokeless Tobacco: Never Used  Alcohol Use: Positive  Drug Use: Negative  PCP: Destiny Alvarado   Marital Status:  Single     Review of Systems   Gastrointestinal: Positive for abdominal pain, nausea and vomiting. Negative for diarrhea.   Genitourinary: Positive for flank pain. Negative for hematuria and vaginal bleeding.   Musculoskeletal: Positive for back pain.        Radiating leg pain   All other systems reviewed and are negative.    Physical Exam     Patient Vitals for the past 24 hrs:   BP Temp Pulse Heart Rate Resp SpO2 Weight   12/18/19 0400 100/56 -- 73 -- -- 97 % --    12/18/19 0330 100/55 -- 75 -- -- 96 % --   12/18/19 0218 102/52 -- -- -- -- 96 % --   12/18/19 0015 92/79 98.4  F (36.9  C) -- 64 18 100 % 64 kg (141 lb 1.5 oz)      Physical Exam    General:              Well-nourished              Speaking in full sentences  Eyes:              Conjunctiva without injection or scleral icterus  ENT:              Moist mucous membranes              Nares patent              Pinnae normal  Neck:              Full ROM              No stiffness appreciated  Resp:              Lungs CTAB              No crackles, wheezing or audible rubs              Good air movement  CV:                    Normal rate, regular rhythm              S1 and S2 present              No murmur, gallop or rub  GI:              BS present              Abdomen soft without distention              Tenderness to palpation to deep lower right pelvis   No notable pain over McBurney's point              No guarding or rebound tenderness   No CVA tenderness  Skin:              Warm, dry, well perfused              No rashes or open wounds on exposed skin   No vesicular lesions  MSK:              Moves all extremities              No focal deformities or swelling  Neuro:              Alert              Answers questions appropriately              Moves all extremities equally              Gait stable  Psych:              Normal affect, normal mood    Emergency Department Course     Imaging:  Radiology findings were communicated with the patient who voiced understanding of the findings.    Abd/pelvis CT no contrast - Stone Protocol  1.  Cystic area in the pelvis should correspond to the right ovarian cyst seen on ultrasound. It measures slightly larger but this likely includes a small amount of adjacent pelvic fluid.  2.  No urinary stones, hydronephrosis or other acute findings.  Reading per radiology    US Pelvic Complete w Transvaginal & Abd/Pel Duplex Limited  1.  3.7 cm simple cyst/dominant follicle right ovary  and a small amount of free fluid.  2.  Otherwise negative.  Reading per radiology      Laboratory:  Laboratory findings were communicated with the patient who voiced understanding of the findings.    UA: Mucous present (A), o/w WNL.     CBC: WBC 12.5 (H), HGB 14.0,   CMP: Glucose 109 (H), BUN 4 (L), o/w WNL (Creatinine 0.48 (L))    Lipase: 105    HCG Quantitative: <1  ISTAT HCG Quantitative: 13.9 (H)     Interventions:  0049 Dilaudid 0.2 mg IV  0050 Zofran 4 mg IV  0050 NS 1000 mL IV   0117 morphine 4 mg IV  0117 Dilaudid 0.2 mg IV  0404 Dilaudid 0.2 mg IV    Emergency Department Course:    0031 Nursing notes and vitals reviewed. I performed an exam of the patient as documented above. IV was inserted and blood was drawn for laboratory testing, results above.     0057 The patient was sent for a US while in the emergency department, results above.      0137 Patient rechecked and updated.      0157 The patient provided a urine sample here in the emergency department. This was sent for laboratory testing, findings above.     0223 The patient was sent for a CT while in the emergency department, results above.      0301 I spoke with Dr. Ghosh of the radiology service regarding patient's presentation, findings, and plan of care.     0310 Patient rechecked and updated.      0340 Patient rechecked and updated.      0431 I spoke with Dr. Drew of the hospitalist service from [facility] regarding patient's presentation, findings, and plan of care.     0433 I spoke with Dr. Sorenson of the OB/GYN service from [facility] regarding patient's presentation, findings, and plan of care.     0444 Patient rechecked and updated.      0557 I spoke with Dr. Sorenson of the OB/GYN service from [facility] regarding patient's presentation, findings, and plan of care.     0700 I spoke with Dr. Drew of the hospitalist service from [facility] regarding patient's presentation, findings, and plan of care.     0705 I spoke  with Dr. Sorenson of the OB/GYN service from [facility] regarding patient's presentation, findings, and plan of care.    Prior to admission, I personally reviewed the results with the patient and all related questions were answered. The patient verbalized understanding and is amenable to plan.     Impression & Plan      Medical Decision Making:  Hailey Faria is a 24 yr old F presenting to the ER for evaluation of flank pain.  VS on presentation reveal BP of 92/79, though are otherwise unremarkable.  DDx is broad, including though is not limited to, ectopic pregnancy, ovarian process (cyst, cyst rupture, torsion), acute appendicitis, ureteral stone, UTI, pyelonephritis, colitis, among others.  Initial iSTAT HCG returned at 13, though formal serum quantitative HCG is negative.  Given acute onset of pain, previous hx of ovarian cysts, initial evaluation included pelvic US, which demonstrates 3.7 cm simple cyst/dominant follicle to the right ovary and a small amount of free fluid, though is otherwise negative.  Pt notes a previous history of ovarian cyst, involving the right ovary, and given ongoing pain, alternative pathology also considered, prompting CT abdomen/pelvis.  CT again confirms a cystic area to the pelvis above the right ovary with adjacent pelvic fluid.  Fortunately, no evidence of urinary stone, or hydronephrosis, or other acute findings are noted.  I reviewed the CT with Dr. Ghosh of radiology, who felt he could identify the appendix, and noted it to be normal in size.  We discussed repeat imaging with contrast, though he did not feel his ability to visualize the appendix would change significantly.  Clinically, patient symptoms seem less consistent with acute appendicitis as well given the rapid onset of her pain.  Labs demonstrate mild leukocytosis with a WBC count of 12.5.  Overall, her symptoms are suspicious for pain related to ovarian cyst and possible cyst rupture, with associated fluid in  the lower pelvis causing pain.  I strongly considered ovarian torsion as patient's pain was fairly persistent despite high doses of IV analgesia.  For that reason, I consulted with Dr. Sorenson from OB/GYN, who is graciously presented to the patient's bedside for further evaluation.  Please refer to her associated documentation.  At this time, suspicion for ovarian torsion is felt to be low, and blood flow is noted bilaterally, though given ongoing pain and discomfort, patient will be admitted to the hospital for observation, close monitoring of her symptoms, and plan for repeat ultrasound and hemoglobin.  We discussed consideration of repeat CT imaging with contrast if symptoms persist or worsen if during patient's observation, although again at this point, her symptoms do seem less likely to be secondary to acute appendicitis given the acute onset of pain, as well as pain within the deeper pelvis.  Her abdominal exam is actually quite soft, without pain over McBurney's point.  Plan of care was discussed with the patient and family at bedside.  They are in agreement with proposed plan of care.  Questions were answered prior to admission.  Symptoms are under improved control following the above interventions.    Diagnosis:    ICD-10-CM    1. Abdominal pain, right lower quadrant R10.31    2. Cyst of right ovary N83.201      Disposition:   The patient is admitted to the hospitalist service.     Scribe Disclosure:  I, Orla Severson, am serving as a scribe at 12:30 AM on 12/18/2019 to document services personally performed by Dannie Luke MD based on my observations and the provider's statements to me.    [facility] EMERGENCY DEPARTMENT       Dannie Luke MD  12/18/19 [id number]

## 2019-12-18 NOTE — H&P
History and Physical     Hailey Faria MRN# [id number]   YOB: 1995 Age: 24 year old      Date of Admission:  12/18/2019    Primary care provider: Destiny Alvarado          Assessment and Plan:   Hailey Faria is a 24 year old female with a PMH significant for acne and known right ovarian cyst who presents with acute onset of intractable right flank pain radiating to her right low abdomen associated with nausea and vomiting.    Care was discussed with Dr. Drew who took signout from the ED provider.  Also spoke with the consulting OB/GYN provider.    #Right flank pain likely due to right ovarian cyst  Patient developed acute intractable right flank pain radiating to her right low abdomen yesterday evening associated with nausea, vomiting and chills.  She has no symptoms of urinary tract infection.  She was afebrile in the ED and lab work was remarkable for a WBC of 12.5 but otherwise labs appeared fine.  Her i-STAT hCG was slightly elevated at 13.9 but official lab interpretation was negative and patient denies any chance of being pregnant.  UA appeared negative without blood.  Vaginal ultrasound showed a 3.7 cm simple cyst/dominant follicle of the right ovary with a small amount of free fluid.  CT abdomen redemonstrated the cystic area in the pelvis without evidence of urinary stones or hydronephrosis.  There is no comment on the appendix.  OB/GYN was called in the ED who recommended observation and hospitalist admission.  -OB/GYN consult  -Repeat vaginal ultrasound at 10 am  -Repeat hemoglobin  -N.p.o. in case surgery is needed  -IV fluids ordered by OB/GYN  -We will have IV pain and nausea medications available    Ovarian cyst is increasing in size with possible torsion noted. Hemoglobin also dropped from 14 to 11.8.  OB/Gyn will perform laparoscopy today.    #Acne  Holding Spironolactone for now        Social: No concerns   Code: Discussed with patient and they have chosen full  code  VTE prophylaxis: ambulation  Disposition: Observation                    Chief Complaint:   Right flank pain         History of Present Illness:   Hailey Faria is a 24 year old female who presents with intractable constant right flank pain that started yesterday evening.  The pain wraps around to her right low abdomen and is associated with nausea, vomiting and some chills.  She denies diarrhea, constipation, hematuria and dysuria.  Her last period was 1 month ago.  She reports similar cramping like pain when her Mirena IUD was in but this has stopped since it was removed.  She has noted some lower abdominal pain when running but this is not present at rest.  She is otherwise healthy without any recent illnesses except for a cold and only take Spironolactone for acne.  She does not drink alcohol regularly or smoke cigarettes.             Past Medical History:     Past Medical History:   Diagnosis Date     Anemia      Fatigue      Ovarian cyst      Rectal bleeding                Past Surgical History:     Past Surgical History:   Procedure Laterality Date     DENTAL SURGERY       HC ANGIOGRAPHY VISCERAL SELECT W/WO AORTOGRAM       tooth extraction      [place] teet               Social History:     Social History     Socioeconomic History     Marital status: Single     Spouse name: Not on file     Number of children: Not on file     Years of education: Not on file     Highest education level: Not on file   Occupational History     Not on file   Social Needs     Financial resource strain: Not on file     Food insecurity:     Worry: Not on file     Inability: Not on file     Transportation needs:     Medical: Not on file     Non-medical: Not on file   Tobacco Use     Smoking status: Never Smoker     Smokeless tobacco: Never Used   Substance and Sexual Activity     Alcohol use: Yes     Alcohol/week: 0.0 standard drinks     Comment: 2 a month     Drug use: No     Sexual activity: Yes     Partners: Male      "Birth control/protection: Condom   Lifestyle     Physical activity:     Days per week: Not on file     Minutes per session: Not on file     Stress: Not on file   Relationships     Social connections:     Talks on phone: Not on file     Gets together: Not on file     Attends Oriental orthodox service: Not on file     Active member of club or organization: Not on file     Attends meetings of clubs or organizations: Not on file     Relationship status: Not on file     Intimate partner violence:     Fear of current or ex partner: Not on file     Emotionally abused: Not on file     Physically abused: Not on file     Forced sexual activity: Not on file   Other Topics Concern     Not on file   Social History Narrative     Not on file               Family History:     Family History   Problem Relation Age of Onset     Family History Negative Other             Allergies:    No Known Allergies            Medications:     Prior to Admission medications    Medication Sig Last Dose Taking? Auth Provider   ferrous sulfate (IRON) 325 (65 FE) MG tablet Take 325 mg by mouth daily  12/17/2019 at Unknown time Yes Shireen Watts MD   multivitamin w/minerals (THERA-VIT-M) tablet Take 1 tablet by mouth daily 12/17/2019 at Unknown time Yes Unknown, Entered By History   spironolactone (ALDACTONE) 50 MG tablet Take 50 mg by mouth 3 times daily 12/17/2019 at Unknown time Yes Unknown, Entered By History              Review of Systems:   A Comprehensive greater than 10 system review of systems was carried out.  Pertinent positives and negatives are noted above.  Otherwise negative for contributory information.            Physical Exam:   Blood pressure 98/52, pulse 73, temperature 97.6  F (36.4  C), temperature source Oral, resp. rate 16, height 1.702 m (5' 7\"), weight 60.8 kg (134 lb), last menstrual period 11/18/2019, SpO2 98 %, not currently breastfeeding.  Exam:  GENERAL:  Comfortable.  PSYCH: pleasant, oriented, No acute distress.  HEENT:  PERRLA. " Normal conjunctiva, normal hearing, nasal mucosa and Oropharynx are normal.  NECK:  Supple, no neck vein distention, adenopathy or bruits, normal thyroid.  HEART:  Normal S1, S2 with no murmur, no pericardial rub, gallops or S3 or S4.  LUNGS:  Clear to auscultation, normal Respiratory effort. No wheezing, rales or ronchi.  ABDOMEN:  Soft, no hepatosplenomegaly, normal bowel sounds. Non-tender, non distended.   EXTREMITIES:  No pedal edema, +2 pulses bilateral and equal.  SKIN:  Dry to touch, No rash, wound or ulcerations.  NEUROLOGIC:  CN 2-12 grossly intact,  sensation is intact with no focal deficits.               Data:     No results for input(s): PH, PHV, PO2, PO2V, SAT, PCO2, PCO2V, HCO3, HCO3V in the last 168 hours.  Recent Labs   Lab 12/18/19  0031      POTASSIUM 3.8   CHLORIDE 107   CO2 28   ANIONGAP 3   *   BUN 4*   CR 0.48*   GFRESTIMATED >90   GFRESTBLACK >90   MARY 8.5   PROTTOTAL 5.7*   ALBUMIN 2.5*   BILITOTAL 0.4   ALKPHOS 66   AST 39   ALT 32         Recent Results (from the past 24 hour(s))   US Pelvic Complete w Transvaginal & Abd/Pel Duplex Limited    Narrative    EXAM: US PELVIS COMPLETE W TRANSVAGINAL AND DOPPLER LIMITED  LOCATION: [facility]  DATE/TIME: 12/18/2019 12:51 AM    INDICATION: Pelvic pain.  COMPARISON: None.  TECHNIQUE: Transabdominal scans were performed. Endovaginal ultrasound was performed to better visualize the adnexa. Color flow with spectral Doppler and waveform analysis performed.    FINDINGS:    UTERUS: 8.8 x 3.6 x 4.2 cm. No uterine mass.    ENDOMETRIUM: 4 mm. No focal endometrial abnormality.    RIGHT OVARY: 5.9 x 3.1 x 4.6 cm. 3.7 cm in greatest diameter simple cyst/dominant follicle.    LEFT OVARY: 2.7 x 1.9 x 1.2 cm. No masses.    Doppler color and waveform analysis demonstrates blood flow to both ovaries.     Small amount of free fluid.      Impression    IMPRESSION:    1.  3.7 cm simple cyst/dominant follicle right ovary and a small amount  of free fluid.  2.  Otherwise negative.         Abd/pelvis CT no contrast - Stone Protocol    Narrative    EXAM: CT ABDOMEN PELVIS W/O CONTRAST  LOCATION: [facility]  DATE/TIME: 12/18/2019 2:23 AM    INDICATION: Right flank pain. Nausea and vomiting.  COMPARISON: Pelvic ultrasound 12/18/2019.  TECHNIQUE: CT scan of the abdomen and pelvis was performed without IV contrast. Multiplanar reformats were obtained. Dose reduction techniques were used.  CONTRAST: None.    FINDINGS:   LOWER CHEST: Normal.    HEPATOBILIARY: Within normal limits. No radiopaque gallstones or bile duct dilatation.     PANCREAS: Normal.    SPLEEN: Normal.    ADRENAL GLANDS: Normal.    KIDNEYS/BLADDER: Within normal limits. No urinary stones or hydronephrosis. Bladder is decompressed.    BOWEL: Moderate stool in the colon. No bowel obstruction.    LYMPH NODES: Normal.    VASCULATURE: Unremarkable.    PELVIC ORGANS: Uterus is present. 6.6 x 5.4 cm low-density, cystic area in the pelvis.  OTHER: Small amount of free pelvic fluid. Evaluation of the solid abdominal organs is limited by lack of intravenous contrast.    MUSCULOSKELETAL: Normal.      Impression    IMPRESSION:   1.  Cystic area in the pelvis should correspond to the right ovarian cyst seen on ultrasound. It measures slightly larger but this likely includes a small amount of adjacent pelvic fluid.  2.  No urinary stones, hydronephrosis or other acute findings.         Aisha Dawson PA-C

## 2019-12-18 NOTE — PLAN OF CARE
PRIMARY DIAGNOSIS: ACUTE PAIN  OUTPATIENT/OBSERVATION GOALS TO BE MET BEFORE DISCHARGE:  1. Pain Status: Improved but still requiring IV narcotics.    2. Return to near baseline physical activity: Yes    3. Cleared for discharge by consultants (if involved): No    Discharge Planner Nurse   Safe discharge environment identified: Yes  Barriers to discharge: No       Entered by: Luz Darby 12/18/2019 1:09 PM     Please review provider order for any additional goals.   Nurse to notify provider when observation goals have been met and patient is ready for discharge.    Pt is alert and oriented. States pain is 5/10 after Dilaudid which is tolerable. Pt has OB consult and US scheduled for 10am.

## 2019-12-18 NOTE — CONSULTS
GYN CONSULT    Patient is being seen in consultation at the request of Dr. Luke.    HPI:  Pt is a 24 year old G0 with LMP 11/19/19 who presented to [facility] c/o sudden onset right flank and RLQ pain.  Her symptoms began last evening with right flank ache that progressed to sharp pain wrapping around into her abdomen and radiating down her right leg. She had several bouts of emesis at home, and the pain did not resolve after over 2 hours, so she presented for evaluation at [facility] ED.     Since arriving, she has received 3 doses of Dilaudid, 4mg of Morphine, and 15 mg Toradol.  Her pain is much reduced at this time and she is not longer nauseated.  She reports experiencing severe pain with transvaginal ultrasound earlier this morning.    Pt h/o ovarian cyst, states last US done in our office approx 1 year ago showed right ovarian cyst that was small and did not require follow up. Of note, pt is a runner and recently completed the [facility]. She has not been running since then event, and reports ongoing issues with low pelvic pain with running that she felt was musculoskeletal.    The patient denies hematuria or other urinary symptoms, vaginal bleeding, diarrhea, or recent falls, injuries, or heavy lifiting.  There is no personal or family history of kidney stones.     OBHx:  G0    GynHx: Denies h/o abnormal paps or STIs. Painful ovarian cyst in the past, but not requiring hospitalization.  Sexually active with 1 partner, condoms for contraception, declines STD screening    Med Hx:   Past Medical History:   Diagnosis Date     Anemia      Fatigue      Ovarian cyst      Rectal bleeding        Surg Hx:    Past Surgical History:   Procedure Laterality Date     DENTAL SURGERY       HC ANGIOGRAPHY VISCERAL SELECT W/WO AORTOGRAM       tooth extraction      [place] teet       Meds:    Flonase  Aldactone    Allergies: No Known Allergies    Soc Hx:   Social History     Socioeconomic History     Marital status: Single     Spouse name:  Not on file     Number of children: Not on file     Years of education: Not on file     Highest education level: Not on file   Occupational History     Not on file   Social Needs     Financial resource strain: Not on file     Food insecurity:     Worry: Not on file     Inability: Not on file     Transportation needs:     Medical: Not on file     Non-medical: Not on file   Tobacco Use     Smoking status: Never Smoker     Smokeless tobacco: Never Used   Substance and Sexual Activity     Alcohol use: Yes     Alcohol/week: 0.0 standard drinks     Comment: 2 a month     Drug use: No     Sexual activity: Yes     Partners: Male     Birth control/protection: Condom   Lifestyle     Physical activity:     Days per week: Not on file     Minutes per session: Not on file     Stress: Not on file   Relationships     Social connections:     Talks on phone: Not on file     Gets together: Not on file     Attends Mosque service: Not on file     Active member of club or organization: Not on file     Attends meetings of clubs or organizations: Not on file     Relationship status: Not on file     Intimate partner violence:     Fear of current or ex partner: Not on file     Emotionally abused: Not on file     Physically abused: Not on file     Forced sexual activity: Not on file   Other Topics Concern     Not on file   Social History Narrative     Not on file       Fam Hx:   Family History   Problem Relation Age of Onset     Family History Negative Other        PE:    VS:  /56   Pulse 73   Temp 98.4  F (36.9  C)   Resp 18   Wt 64 kg (141 lb 1.5 oz)   LMP 11/18/2019   SpO2 97%   BMI 22.77 kg/m    Gen:  A&O, NAD  Lungs:  CTAB  CV:  RRR  Abd:  Soft, non-tender, non-distended, no rebound guarding.  Pelvic: Normal external genitalia, no bleeding or discharge noted, cervix without lesions and non tender; uterus small mobile and mildly tender; left adnexa mildly tender, right adnexa with moderate tenderness but no palpable  masses.    Labs:    Lab Results   Component Value Date    WBC 12.5 (H) 12/18/2019    HGB 14.0 12/18/2019    HCT 43.6 12/18/2019     (H) 12/18/2019     12/18/2019     Lab Results   Component Value Date    CR 0.48 (L) 12/18/2019     Lab Results   Component Value Date     12/18/2019    POTASSIUM 3.8 12/18/2019    CHLORIDE 107 12/18/2019    CO2 28 12/18/2019     (H) 12/18/2019      UA mucous present o/w negative    Quant HCG <1    IMAGING:       Ultrasound at 1:15 am:     UTERUS: 8.8 x 3.6 x 4.2 cm. No uterine mass.   ENDOMETRIUM: 4 mm. No focal endometrial abnormality.  RIGHT OVARY: 5.9 x 3.1 x 4.6 cm. 3.7 cm in greatest diameter simple cyst/dominant follicle.  LEFT OVARY: 2.7 x 1.9 x 1.2 cm. No masses.     Doppler color and waveform analysis demonstrates blood flow to both ovaries.   Small amount of free fluid.                                                                  IMPRESSION:    1.  3.7 cm simple cyst/dominant follicle right ovary and a small amount of free fluid.  2.  Otherwise negative.    CT abd/pelvis stone search  IMPRESSION:   1.  Cystic area in the pelvis should correspond to the right ovarian cyst seen on ultrasound. It measures slightly larger but this likely includes a small amount of adjacent pelvic fluid.  2.  No urinary stones, hydronephrosis or other acute findings.  Dr. Luke states he spoke with the radiologist who could see the appendix, and it looked normal, despite no contrast being given.    A/P:  24 year old G0 LMP 1 month ago who presents with acute onset right flank and pelvic pain. Ddx includes ruptured ovarian cyst, hemorrhagic ovarian cyst, possible ovarian torsion. Discussed with patient and mother at length option of proceeding immediately with laparoscopy to better diagnose and allow timely intervention in case of possible torsion. However, US confirmed flow to the ovary and small size of ovarian cyst decreases the likelihood of torsion. For this  reason, it is reasonable to monitor the patient.  1. Admit to observation. NPO in case surgery warranted. IVF maintenance, prn pain and nausea meds.  2. Repeat PUS 10-12 hours after first scan to assess for blood flow ovary, size of cyst, and amount of free fluid  3. Repeat Hgb to watch for drop in case of hemorrhagic cyst  4. Monitor clinical picture of patient over the course of the morning.  5. Appreciate Hospitalist admission in case non gynecologic issue explains patient's symptoms.  6. Consider discharge if symptoms improve later today or tomorrow.    Melissa Sorenson MD  12/18/2019  5:36 AM

## 2019-12-18 NOTE — PROGRESS NOTES
GYN follow up    Patient reports persistent achy pain in the right lower abdomen, radiating to the upper leg/hip and back.   She continues to require narcotic analgesics to control the pain.  She is thirsty but not hungry. No nausea or vomiting.     Vitals remain normal  /47  Temp 98.4  RR  16   Pulse  61    Abdominal exam unchanged.  Tenderness in the right lower abdomen.     Ultrasound follow up:     Study Result     PELVIC ULTRASOUND WITH ENDOVAGINAL TRANSDUCER 12/18/2019 10:44 AM      HISTORY: Possible torsion, reevaluate flow to ovary, free fluid, and  size of right ovarian cyst.     TECHNIQUE: Endovaginal sonography was added to the transabdominal exam  to better evaluate the uterus and ovaries.     COMPARISON: Pelvic ultrasound 12/18/2019     FINDINGS: The uterus is normal in size measuring 7.3 x 4.1 x 4.0 cm.  No focal lesions are seen in the myometrium. Endometrium is 0.2 cm  thick and appears normal.      The right ovary measures up to 7.1 cm. A 3.5 x 3.0 x 3.2 cm hypoechoic  lesion is present within the superior aspect of the right ovary which  demonstrates few low level internal echoes and no central flow. A  similar smaller lesion is present within the inferior right ovary  measuring 2.6 x 2.8 x 2.8 cm. Normal arterial and venous Doppler  waveforms are obtained from the right ovary.     The left ovary measures 4.1 x 2.8 x 2.4 cm. There are no suspicious  masses in the left ovary. Doppler waveforms are within normal limits.     Moderate volume free fluid is present.                                                                      IMPRESSION: Abnormally enlarged right ovary measuring up to 7.1 cm  with 2 cystic structures measuring up to 3.5 and 2.8 cm respectively  demonstrating low-level internal echoes suggestive of hemorrhagic  cysts. Arterial and venous Doppler waveforms to the right ovary are  normal, however intermittent torsion cannot be completely excluded.  Moderate volume free fluid.  Recommend continued follow-up.     LUNA CHARLTON MD     Lab follow up    Hemoglobin from 10 am this morning 11.8  (down from admit hemoglobin of 14.0)    Impression:   Persistent pain in the setting of  dynamically changing right ovarian imaging.  Increase in free pelvic fluid.  Persistent right sided lower abdominal pain.  Ultrasound suggests hemorrhagic ovarian cysts but no evidence of adnexal torsion.     Plan:  Discussed given the clinical picture and ultrasound evidence of an evolving process in the right adnexae (combined with a drop in hemoglobin that seems in excess of what would be expected from hemodilution), recommend a laparoscopic exploration and corrective measures.  The patient and her mother agree with the plan of care.    The procedure will be scheduled as a laparoscopy, pelviscopy, possible right ovarian cystectomy/right oophorectomy.  The patients future fertility will be kept foremost in mind prior to any surgical intervention.  All questions answered.  We will plan an overnight stay after surgery and anticipate discharge home tomorrow.

## 2019-12-18 NOTE — PHARMACY-ADMISSION MEDICATION HISTORY
Admission medication history interview status for this patient is complete. See [facility] admission navigator for allergy information, prior to admission medications and immunization status.     Medication history interview source(s):Patient  Medication history resources (including written lists, pill bottles, clinic record):None  Primary pharmacy:[facility]    Changes made to PTA medication list:  Added: mv  Deleted: flonase  Changed: spironolactone    Actions taken by pharmacist (provider contacted, etc):None     Additional medication history information:None    Medication reconciliation/reorder completed by provider prior to medication history?  Yes     Do you take OTC medications (eg tylenol, ibuprofen, fish oil, eye/ear drops, etc)? Yes     For patients on insulin therapy: No    Prior to Admission medications    Medication Sig Last Dose Taking? Auth Provider   ferrous sulfate (IRON) 325 (65 FE) MG tablet Take 325 mg by mouth daily  12/17/2019 at Unknown time Yes Shireen Watts MD   multivitamin w/minerals (THERA-VIT-M) tablet Take 1 tablet by mouth daily 12/17/2019 at Unknown time Yes Unknown, Entered By History   spironolactone (ALDACTONE) 50 MG tablet Take 50 mg by mouth 3 times daily 12/17/2019 at Unknown time Yes Unknown, Entered By History

## 2019-12-19 VITALS
HEART RATE: 57 BPM | SYSTOLIC BLOOD PRESSURE: 92 MMHG | WEIGHT: 134 LBS | RESPIRATION RATE: 16 BRPM | DIASTOLIC BLOOD PRESSURE: 41 MMHG | TEMPERATURE: 96.1 F | BODY MASS INDEX: 21.03 KG/M2 | OXYGEN SATURATION: 96 % | HEIGHT: 67 IN

## 2019-12-19 LAB — GLUCOSE BLDC GLUCOMTR-MCNC: 115 MG/DL (ref 70–99)

## 2019-12-19 PROCEDURE — 99239 HOSP IP/OBS DSCHRG MGMT >30: CPT | Performed by: PHYSICIAN ASSISTANT

## 2019-12-19 PROCEDURE — 00000146 ZZHCL STATISTIC GLUCOSE BY METER IP

## 2019-12-19 PROCEDURE — 25000128 H RX IP 250 OP 636: Performed by: PHYSICIAN ASSISTANT

## 2019-12-19 PROCEDURE — 25000132 ZZH RX MED GY IP 250 OP 250 PS 637: Performed by: OBSTETRICS & GYNECOLOGY

## 2019-12-19 RX ORDER — IBUPROFEN 600 MG/1
600 TABLET, FILM COATED ORAL EVERY 6 HOURS PRN
Start: 2019-12-19

## 2019-12-19 RX ORDER — ACETAMINOPHEN 325 MG/1
325-650 TABLET ORAL EVERY 6 HOURS PRN
Start: 2019-12-19

## 2019-12-19 RX ORDER — DOCUSATE SODIUM 100 MG/1
100 CAPSULE, LIQUID FILLED ORAL 2 TIMES DAILY PRN
Refills: 0
Start: 2019-12-19

## 2019-12-19 RX ORDER — OXYCODONE HYDROCHLORIDE 5 MG/1
5 TABLET ORAL EVERY 6 HOURS PRN
Qty: 12 TABLET | Refills: 0 | Status: ON HOLD | OUTPATIENT
Start: 2019-12-19 | End: 2023-11-08

## 2019-12-19 RX ORDER — POLYETHYLENE GLYCOL 3350 17 G/17G
17 POWDER, FOR SOLUTION ORAL 2 TIMES DAILY PRN
Status: CANCELLED
Start: 2019-12-19

## 2019-12-19 RX ORDER — POLYETHYLENE GLYCOL 3350 17 G/17G
17 POWDER, FOR SOLUTION ORAL 2 TIMES DAILY PRN
Status: DISCONTINUED | OUTPATIENT
Start: 2019-12-19 | End: 2019-12-19 | Stop reason: HOSPADM

## 2019-12-19 RX ORDER — OXYCODONE HYDROCHLORIDE 5 MG/1
5 TABLET ORAL EVERY 6 HOURS PRN
Qty: 12 TABLET | Refills: 0 | Status: SHIPPED | OUTPATIENT
Start: 2019-12-19 | End: 2019-12-19

## 2019-12-19 RX ADMIN — OXYCODONE HYDROCHLORIDE 10 MG: 5 TABLET ORAL at 11:00

## 2019-12-19 RX ADMIN — ACETAMINOPHEN 975 MG: 325 TABLET, FILM COATED ORAL at 06:15

## 2019-12-19 RX ADMIN — OXYCODONE HYDROCHLORIDE 10 MG: 5 TABLET ORAL at 04:38

## 2019-12-19 RX ADMIN — SODIUM CHLORIDE, SODIUM LACTATE, POTASSIUM CHLORIDE, CALCIUM CHLORIDE AND DEXTROSE MONOHYDRATE: 5; 600; 310; 30; 20 INJECTION, SOLUTION INTRAVENOUS at 04:34

## 2019-12-19 NOTE — PLAN OF CARE
"BP 92/53 (BP Location: Right arm)   Pulse 57   Temp 96.8  F (36  C) (Oral)   Resp 16   Ht 1.702 m (5' 7\")   Wt 60.8 kg (134 lb)   LMP 11/18/2019   SpO2 97%   BMI 20.99 kg/m      Neuro: AxOx4  Cardiac: Bradycardic  Lungs: Clear, On capno. RA.  GI: Lap sites x2, CDI. Ice applied. BS active active. Have not pass gas. No nausea.  : Voiding adequately, orange color.  Pain: 2-3/10 achy pain in right abdomen, back, and shoulder. PRN Toradol, oxycodone, and scheduled Tylenol given.  IV: PIV infusing D5 in LR at 125 mL/hr.  Labs/tests: BG on POD1 was 115  Diet: Regular diet to try in the morning  Activity: SBA d/t capno. Steady gait.  Plan: Pain control, tolerate diet    Patient is stable and resting in bed. Will continue to monitor and provide cares.       "

## 2019-12-19 NOTE — OP NOTE
Operative Note   [facility]  2019     Hailey Faria    : 1995   MRN: [id number]     Preoperative diagnosis:   - Pelvic pain   - evolving right ovarian cyst   - possible torsion    Postoperative diagnosis:   - Pelvic Pain  - Right ovarian cyst    Procedure:   Dx Laparoscopy   Right ovarian cystectomy and detorsion     Surgeon: Jey Ramos MD   Assistants: Maximo Hinton MD (Dr Hinton was needed due to the emergent need for the case and possible complication risk) He assisted by operating the laparoscope and retracting in order to save the ovary and minimize damage to the ovary when removing the cyst.   Anesthesia: GETA  Complications: none   EBL: 10 cc  Urine: 200 cc of clear urine   Pathology: Tubes and ovaries   Findings:  On EUA normal size anteverted uterus.  On Laparoscopy there was a normal upper abdomen. Grossly normal appearing left tube, ovary and Uterus.  The right fallopian tube and ovary were twisted around each other 2 times.  The fallopian tube appeared edematous at the start of the case and normal by the end.  The ovary was purple in color and became white and normal in appearance after detorsion and removal of the cyst.     Indications: Pt is a 24 year old  who has was admitted this am with pelvic pain that continued to require IV pain medications on a regular basis.  The appearance of the right ovary also evolved from a simple 3.5 cm cyst to a loculated, complex cyst, and a change to the overall ovarian size to 7cm from 5 cm . The risks, benefits and alternatives were discussed in detail with the patient and she agreed to proceed. Informed consent was signed prior to the procedure.   Procedure:   The patient was taken to the operating room where she was prepped and draped in the usual sterile fashion. GETA was obtained and found to be adequate. She was positioned to the dorsal lithotomy position with yellow-fin stirrups.   A 5 mm umbilical skin incision was  made. Through this a trocar with camera in place was inserted into the abdominal cavity and CO2 started. The abdomen was allowed to fill.  A 5 mm skin incision was then made in RLQ. Through which a trocar sleeve was placed into the abdominal cavity using direct observation with the laparoscope.  The above noted findings were apparent.  The right ovary and fallopian tube were detorsed and the cyst was excised with the thunder beat devise and monopolar cautery. This occurred without incident. There was hemostasis at the operative sites.  The gas was allowed to escape from the abdomen and the trochars were removed. The skin was closed with 4-0 Vicryl and dermabond  There were no complications to the procedure. Blood loss was minimal. The patient went to the postanesthesia recovery room in stable condition.   Jey Ramos MD   December 18, 2019

## 2019-12-19 NOTE — ANESTHESIA CARE TRANSFER NOTE
Patient: Hailey Faria    Procedure(s):  DIAGNOSTIC Laparoscopy, Right Cystectomy,    Diagnosis: * No pre-op diagnosis entered *  Diagnosis Additional Information: No value filed.    Anesthesia Type:   General, ETT     Note:  Airway :Face Mask  Patient transferred to:PACU  Comments: VSS.  Spontaneously breathing O2 per open face mask.  Report given to RN.Handoff Report: Identifed the Patient, Identified the Reponsible Provider, Reviewed the pertinent medical history, Discussed the surgical course, Reviewed Intra-OP anesthesia mangement and issues during anesthesia, Set expectations for post-procedure period and Allowed opportunity for questions and acknowledgement of understanding      Vitals: (Last set prior to Anesthesia Care Transfer)    CRNA VITALS  12/18/2019 1821 - 12/18/2019 1858      12/18/2019             Pulse:  98    SpO2:  100 %    Resp Rate (observed):  (!) 2                Electronically Signed By: Dean Dennis Severson, APRN CRNA  December 18, 2019  6:58 PM

## 2019-12-19 NOTE — DISCHARGE SUMMARY
[facility]    Discharge Summary  Hospitalist    Date of Admission:  12/18/2019  Date of Discharge:  12/19/2019  Discharging Provider: Alva Morejon PA-C  Date of Service (when I saw the patient): 12/19/19    Discharge Diagnoses   Pelvic pain  Right ovarian cyst  Anemia due to iron deficiency    History of Present Illness   Hailey Faria is a 24 year old female with a PMH significant for acne, known right ovarian cyst who presented with acute onset of intractable right flank pain radiating to her right low abdomen associated with nausea and vomiting, chills.   She had no symptoms of urinary tract infection.  She was afebrile in the ED and lab work was remarkable for a WBC of 12.5 but otherwise labs appeared fine. UA appeared negative without blood.  Vaginal ultrasound showed a 3.7 cm simple cyst/dominant follicle of the right ovary with a small amount of free fluid.  CT abdomen redemonstrated the cystic area in the pelvis without evidence of urinary stones or hydronephrosis.  There was no comment on the appendix.    The patient's most recent menstrual cycle was 1 month ago. She recalls a history of ovarian cysts, however, has not had any issues since having the Mirena removed.  OB/GYN was called in the ED who recommended observation and hospitalist admission the patient continued to have pelvic pain. Dr. Hinton ultimately offered the patient laparoscopy given persistent pain and concern for potential ovarian torsion.         Hospital Course   Hailey Faria was admitted on 12/18/2019.  The following problems were addressed during her hospitalization:    Right flank and pelvic pain likely due to right ovarian cyst with possible torsion s/p laparoscopy, pelviscopy, right ovarian cystectomy  Pain controlled. Following expected post operative course.  -Follow up as directed by OBGYN in about two weeks time  -Alternate ibuprofen/Tylenol for pain control, as needed oxycodone prescription  "provided  -bowel regimen recommended    Anemia   Hgb 12.2 from 11.8 prior to surgery, EBL 10 ml.  -Resume oral iron supplementation       History of Acne  Spironolactone hold during hospitalization.  -Ok to resume Spironolactone           Pending Results   These results will be followed up by OBGYN.   Unresulted Labs Ordered in the Past 30 Days of this Admission     Date and Time Order Name Status Description    12/18/2019 1822 Surgical pathology exam In process           Code Status   Full Code       Primary Care Physician   Destiny Alvarado      INTERVAL HISTORY  Patient is afebrile, pain is controlled.  VSS.  Voiding spontaneously.  No bowel movement yet.  No chest pain, shortness of breath, calf pain.  Tolerating dietary advancements.  Ambulating without difficulty.  Endorses some upper respiratory symptoms that she had prior to admission. Non productive cough, some congestion.       BP 98/55 (BP Location: Right arm)   Pulse 57   Temp 96.4  F (35.8  C) (Oral)   Resp 16   Ht 1.702 m (5' 7\")   Wt 60.8 kg (134 lb)   LMP 11/18/2019   SpO2 99%   BMI 20.99 kg/m      Constitutional: Awake, alert, no apparent distress  Respiratory:  Normal work of breathing. Lungs clear to auscultation bilaterally, no crackles or wheezing.  Cardiovascular: Regular rate and rhythm, normal S1 and S2, and no murmur appreciated.   GI: Bowel sounds present. soft, non-distended, non-tender. Incisions CDI.   Skin/Integument: Warm, dry. no peripheral edema.  Neuro: No focal deficits. Moving all extremities with normal strength. Coordination and sensation grossly intact. Speech clear. No focal deficits.   Psych: Appropriate affect.        Discharge Disposition   Discharged to home  Condition at discharge: Stable    Consultations This Hospital Stay   None    Time Spent on this Encounter   IAlva PA-C, personally saw the patient today and spent greater than 30 minutes discharging this patient.    Discharge Orders "   No discharge procedures on file.  Discharge Medications   Current Discharge Medication List      START taking these medications    Details   acetaminophen (TYLENOL) 325 MG tablet Take 1-2 tablets (325-650 mg) by mouth every 6 hours as needed for mild pain    Associated Diagnoses: Post-op pain      docusate sodium (COLACE) 100 MG capsule Take 1 capsule (100 mg) by mouth 2 times daily as needed for constipation  Refills: 0    Associated Diagnoses: Post-op pain      ibuprofen (ADVIL/MOTRIN) 600 MG tablet Take 1 tablet (600 mg) by mouth every 6 hours as needed for moderate pain    Associated Diagnoses: Post-op pain      oxyCODONE (ROXICODONE) 5 MG tablet Take 1 tablet (5 mg) by mouth every 6 hours as needed for breakthrough pain  Qty: 12 tablet, Refills: 0    Associated Diagnoses: Post-op pain         CONTINUE these medications which have NOT CHANGED    Details   ferrous sulfate (IRON) 325 (65 FE) MG tablet Take 325 mg by mouth daily   Refills: 0      multivitamin w/minerals (THERA-VIT-M) tablet Take 1 tablet by mouth daily      spironolactone (ALDACTONE) 50 MG tablet Take 50 mg by mouth 3 times daily           Allergies   No Known Allergies  Data      Results for orders placed or performed during the hospital encounter of 12/18/19 (from the past 24 hour(s))   Hemoglobin   Result Value Ref Range    Hemoglobin 11.8 11.7 - 15.7 g/dL   US Pelvic Complete w Transvaginal & Abd/Pel Duplex Limited    Narrative    PELVIC ULTRASOUND WITH ENDOVAGINAL TRANSDUCER 12/18/2019 10:44 AM     HISTORY: Possible torsion, reevaluate flow to ovary, free fluid, and  size of right ovarian cyst.    TECHNIQUE: Endovaginal sonography was added to the transabdominal exam  to better evaluate the uterus and ovaries.    COMPARISON: Pelvic ultrasound 12/18/2019    FINDINGS: The uterus is normal in size measuring 7.3 x 4.1 x 4.0 cm.  No focal lesions are seen in the myometrium. Endometrium is 0.2 cm  thick and appears normal.     The right ovary  measures up to 7.1 cm. A 3.5 x 3.0 x 3.2 cm hypoechoic  lesion is present within the superior aspect of the right ovary which  demonstrates few low level internal echoes and no central flow. A  similar smaller lesion is present within the inferior right ovary  measuring 2.6 x 2.8 x 2.8 cm. Normal arterial and venous Doppler  waveforms are obtained from the right ovary.    The left ovary measures 4.1 x 2.8 x 2.4 cm. There are no suspicious  masses in the left ovary. Doppler waveforms are within normal limits.    Moderate volume free fluid is present.      Impression    IMPRESSION: Abnormally enlarged right ovary measuring up to 7.1 cm  with 2 cystic structures measuring up to 3.5 and 2.8 cm respectively  demonstrating low-level internal echoes suggestive of hemorrhagic  cysts. Arterial and venous Doppler waveforms to the right ovary are  normal, however intermittent torsion cannot be completely excluded.  Moderate volume free fluid. Recommend continued follow-up.    LUNA CHARLTON MD   ABO/Rh type and screen   Result Value Ref Range    ABO O     RH(D) Pos     Antibody Screen Neg     Test Valid Only At [facility]        Specimen Expires 12/21/2019    CBC with platelets   Result Value Ref Range    WBC 8.8 4.0 - 11.0 10e9/L    RBC Count 3.73 (L) 3.8 - 5.2 10e12/L    Hemoglobin 12.2 11.7 - 15.7 g/dL    Hematocrit 37.4 35.0 - 47.0 %     78 - 100 fl    MCH 32.7 26.5 - 33.0 pg    MCHC 32.6 31.5 - 36.5 g/dL    RDW 11.9 10.0 - 15.0 %    Platelet Count 221 150 - 450 10e9/L   HCG quantitative pregnancy   Result Value Ref Range    HCG Quantitative Serum <1 0 - 5 IU/L   Glucose by meter   Result Value Ref Range    Glucose 115 (H) 70 - 99 mg/dL       Alva [facility]

## 2019-12-19 NOTE — PROGRESS NOTES
"Post-Op Check      S: Pt doing well with pain well controlled with po meds. Denies any nausea, shortness of breath and chest pain. She has not passed gas, no BM. Ambulating, tolerating clear liquid diet. Pain is much better than last night.     O:    BP 91/56 (BP Location: Left arm)   Pulse 57   Temp 97.5  F (36.4  C) (Oral)   Resp 16   Ht 1.702 m (5' 7\")   Wt 60.8 kg (134 lb)   LMP 11/18/2019   SpO2 93%   BMI 20.99 kg/m      I/O last 3 completed shifts:  In: 800 [I.V.:800]  Out: 810 [Urine:800; Blood:10]    General:  A&Ox3, NAD  CV:  RRR, no m/r/g  Pulm:  CTAB, good inspiratory effort  Abd: soft, appropriately tender to palpation, nondistended.  No rebound or guarding  Incision: c/d/i with sutures/dermabond   LE: no edema, no calf tenderness    Labs:      A/P:  24 year old F, POD#1 s/p dx laparoscopic, right ovarian cystectomy and detorsion for severe abdominal pain.   1. FEN: tolerating clear liquid diet.   2. Pain: pain controlled. Only taken one dose of oxy, not taking tylenol/ibuprofen. Encouraged ibuprofen/tylenol at home for a couple of days and prn Oxy if needed for severe pain.   3. CV: No issues. Currently stable.   4. Pulm:  No issues. IS Q2 hours   5. Heme: Hgb 12.2 >EBL 10 ml > post op not obtained.   6. GI: Bowel regimen, Zofran PRN nausea.   7. : voiding on own   8. ID: Currently afebrile.   9. Endocrine: No issues.  10.  Prophylaxis: SCDs, PPI, IS    Appreciate assistance from hospitalist, per OBGYN, stable for discharge. Would like to see for post op exam in 2-4 weeks.Patient to call.     Sweetie George MD , MD [facility]  12/19/2019, 8:41 AM    "

## 2019-12-19 NOTE — PLAN OF CARE
PRIMARY DIAGNOSIS: Laparoscopic Right Cystectomy   OUTPATIENT/OBSERVATION GOALS TO BE MET BEFORE DISCHARGE:  1. Stable vital signs Yes, soft bp  2. Tolerating diet:Yes  3. Pain controlled with oral pain medications:  Yes  4. Positive bowel sounds:  Yes  5. Voiding without difficulty:  Yes  6. Able to ambulate:  Yes  7. Provider specific discharge goals met:  No    Discharge Planner Nurse   Safe discharge environment identified: Yes  Barriers to discharge: Yes       Entered by: Judith Renee 12/18/2019 11:36 PM     Please review provider order for any additional goals.   Nurse to notify provider when observation goals have been met and patient is ready for discharge.    VSS, bp soft, on RA, afebrile. Up SBA. Has voided x2. Denies nausea. Complaining of air pain in her right shoulder, heating pad applied. Denies abdominal pain. Has ambulated, steady on her feet. Denies dizziness. Fluids running at 125mL/hr. Capno in place. PCD on. Incentive spirometer education completed and pt demonstrated correct technique. Clear liquid diet, tolerating. Did complain of a dry throat, lozenges ordered and given. 2 lap sites clean dry and intact. Ice pack applied to lower abdomen. Mom and boyfriend at bedside and supportive. Plan- pain control, advance diet.

## 2019-12-19 NOTE — ANESTHESIA POSTPROCEDURE EVALUATION
Patient: Hailey Faria    Procedure(s):  DIAGNOSTIC Laparoscopy, Right Cystectomy,    Diagnosis:* No pre-op diagnosis entered *  Diagnosis Additional Information: No value filed.    Anesthesia Type:  General, ETT    Note:  Anesthesia Post Evaluation    Patient location during evaluation: PACU  Patient participation: Able to fully participate in evaluation  Level of consciousness: awake  Pain management: adequate  Airway patency: patent  Cardiovascular status: acceptable  Respiratory status: acceptable  Hydration status: acceptable  PONV: controlled     Anesthetic complications: None          Last vitals:  Vitals:    12/18/19 1945 12/18/19 2000 12/18/19 2001   BP: 100/55 102/58    Pulse: 59 57    Resp: 9 8    Temp: 97.6  F (36.4  C)     SpO2: 92% 97% 100%         Electronically Signed By: Long Ivy DO  December 18, 2019  8:39 PM

## 2019-12-20 ENCOUNTER — TELEPHONE (OUTPATIENT)
Dept: FAMILY MEDICINE | Facility: CLINIC | Age: 24
End: 2019-12-20

## 2019-12-20 LAB — COPATH REPORT: NORMAL

## 2019-12-20 NOTE — TELEPHONE ENCOUNTER
ED / Discharge Outreach Protocol    Patient Contact    Attempt # 1    Was call answered?  No.  Left message on voicemail with information to call me back.    Post Discharge Medication Reconciliation Status: unable to reconcile discharge medications due to Unable to get a hold of patient     Babs Perry RN, BSN  [facility]  .

## 2019-12-20 NOTE — TELEPHONE ENCOUNTER
Patient was discharged from [facility] on 12/19/2019 after being treated for Abdominal Pain, Right Lower Quadrant, Post-Op Pain. Triage please follow up with patient.      .Gaye BRITT    [facility] [place] [place]

## 2019-12-23 NOTE — TELEPHONE ENCOUNTER
ED / Discharge Outreach Protocol    Patient Contact    Attempt # 2    Was call answered?  No.  Left message on voicemail with information to call me back.    Post Discharge Medication Reconciliation Status: unable to reconcile discharge medications due to Unable to get a hold of patient     Babs Perry RN, BSN  [facility]  .

## 2019-12-24 NOTE — TELEPHONE ENCOUNTER
ED / Discharge Outreach Protocol    Patient Contact    Attempt # 3    Was call answered?  No.  Left message on voicemail with information to call me back.    Post Discharge Medication Reconciliation Status: unable to reconcile discharge medications due to Unable to get a hold of patient.   Babs Perry RN, BSN  [facility]  .

## 2023-03-24 LAB
ABO (EXTERNAL): NORMAL
GROUP B STREPTOCOCCUS (EXTERNAL): POSITIVE
HEMOGLOBIN (EXTERNAL): 14.5 G/DL (ref 10–16)
HEPATITIS B SURFACE ANTIGEN (EXTERNAL): NONREACTIVE
HEPATITIS C ANTIBODY (EXTERNAL): NONREACTIVE
HIV1+2 AB SERPL QL IA: NONREACTIVE
PLATELET COUNT (EXTERNAL): 214 10E3/UL (ref 100–300)
RH (EXTERNAL): POSITIVE
RUBELLA ANTIBODY IGG (EXTERNAL): NORMAL
TREPONEMA PALLIDUM ANTIBODY (EXTERNAL): NONREACTIVE
URINE CULTURE OB NURSE INTERPRETATION (EXTERNAL RESULT): POSITIVE

## 2023-06-01 ENCOUNTER — TRANSCRIBE ORDERS (OUTPATIENT)
Dept: MATERNAL FETAL MEDICINE | Facility: CLINIC | Age: 28
End: 2023-06-01
Payer: COMMERCIAL

## 2023-06-01 ENCOUNTER — MEDICAL CORRESPONDENCE (OUTPATIENT)
Dept: HEALTH INFORMATION MANAGEMENT | Facility: CLINIC | Age: 28
End: 2023-06-01
Payer: COMMERCIAL

## 2023-06-01 ENCOUNTER — TRANSFERRED RECORDS (OUTPATIENT)
Dept: HEALTH INFORMATION MANAGEMENT | Facility: CLINIC | Age: 28
End: 2023-06-01
Payer: COMMERCIAL

## 2023-06-01 DIAGNOSIS — O26.90 PREGNANCY RELATED CONDITION, ANTEPARTUM: Primary | ICD-10-CM

## 2023-06-05 ENCOUNTER — PRE VISIT (OUTPATIENT)
Dept: MATERNAL FETAL MEDICINE | Facility: CLINIC | Age: 28
End: 2023-06-05

## 2023-08-12 LAB
HEMOGLOBIN (EXTERNAL): 13.4 G/DL (ref 8–16)
TREPONEMA PALLIDUM ANTIBODY (EXTERNAL): NONREACTIVE

## 2023-08-31 ENCOUNTER — HOSPITAL ENCOUNTER (OUTPATIENT)
Facility: CLINIC | Age: 28
End: 2023-08-31
Payer: COMMERCIAL

## 2023-11-02 ENCOUNTER — TRANSFERRED RECORDS (OUTPATIENT)
Dept: HEALTH INFORMATION MANAGEMENT | Facility: CLINIC | Age: 28
End: 2023-11-02
Payer: COMMERCIAL

## 2023-11-06 ENCOUNTER — ANESTHESIA (OUTPATIENT)
Dept: OBGYN | Facility: CLINIC | Age: 28
End: 2023-11-06
Payer: COMMERCIAL

## 2023-11-06 ENCOUNTER — ANESTHESIA EVENT (OUTPATIENT)
Dept: OBGYN | Facility: CLINIC | Age: 28
End: 2023-11-06
Payer: COMMERCIAL

## 2023-11-06 ENCOUNTER — HOSPITAL ENCOUNTER (INPATIENT)
Facility: CLINIC | Age: 28
LOS: 2 days | Discharge: HOME | End: 2023-11-08
Attending: ADVANCED PRACTICE MIDWIFE | Admitting: ADVANCED PRACTICE MIDWIFE
Payer: COMMERCIAL

## 2023-11-06 PROBLEM — Z36.89 ENCOUNTER FOR TRIAGE IN PREGNANT PATIENT: Status: ACTIVE | Noted: 2023-11-06

## 2023-11-06 PROBLEM — Z34.90 TERM PREGNANCY: Status: ACTIVE | Noted: 2023-11-06

## 2023-11-06 LAB
ABO/RH(D): NORMAL
ANTIBODY SCREEN: NEGATIVE
HGB BLD-MCNC: 13.9 G/DL (ref 11.7–15.7)
RUPTURE OF FETAL MEMBRANES BY ROM PLUS: POSITIVE
SPECIMEN EXPIRATION DATE: NORMAL
T PALLIDUM AB SER QL: NONREACTIVE

## 2023-11-06 PROCEDURE — 250N000011 HC RX IP 250 OP 636: Performed by: ADVANCED PRACTICE MIDWIFE

## 2023-11-06 PROCEDURE — 3E0R3BZ INTRODUCTION OF ANESTHETIC AGENT INTO SPINAL CANAL, PERCUTANEOUS APPROACH: ICD-10-PCS | Performed by: STUDENT IN AN ORGANIZED HEALTH CARE EDUCATION/TRAINING PROGRAM

## 2023-11-06 PROCEDURE — 250N000009 HC RX 250: Performed by: ADVANCED PRACTICE MIDWIFE

## 2023-11-06 PROCEDURE — 250N000011 HC RX IP 250 OP 636: Mod: JZ | Performed by: STUDENT IN AN ORGANIZED HEALTH CARE EDUCATION/TRAINING PROGRAM

## 2023-11-06 PROCEDURE — 258N000003 HC RX IP 258 OP 636: Performed by: ADVANCED PRACTICE MIDWIFE

## 2023-11-06 PROCEDURE — 10907ZC DRAINAGE OF AMNIOTIC FLUID, THERAPEUTIC FROM PRODUCTS OF CONCEPTION, VIA NATURAL OR ARTIFICIAL OPENING: ICD-10-PCS | Performed by: OBSTETRICS & GYNECOLOGY

## 2023-11-06 PROCEDURE — 36415 COLL VENOUS BLD VENIPUNCTURE: CPT | Performed by: ADVANCED PRACTICE MIDWIFE

## 2023-11-06 PROCEDURE — 250N000011 HC RX IP 250 OP 636: Performed by: STUDENT IN AN ORGANIZED HEALTH CARE EDUCATION/TRAINING PROGRAM

## 2023-11-06 PROCEDURE — 120N000001 HC R&B MED SURG/OB

## 2023-11-06 PROCEDURE — 00HU33Z INSERTION OF INFUSION DEVICE INTO SPINAL CANAL, PERCUTANEOUS APPROACH: ICD-10-PCS | Performed by: STUDENT IN AN ORGANIZED HEALTH CARE EDUCATION/TRAINING PROGRAM

## 2023-11-06 PROCEDURE — 250N000013 HC RX MED GY IP 250 OP 250 PS 637: Performed by: ADVANCED PRACTICE MIDWIFE

## 2023-11-06 PROCEDURE — 86901 BLOOD TYPING SEROLOGIC RH(D): CPT | Performed by: ADVANCED PRACTICE MIDWIFE

## 2023-11-06 PROCEDURE — 84112 EVAL AMNIOTIC FLUID PROTEIN: CPT | Performed by: ADVANCED PRACTICE MIDWIFE

## 2023-11-06 PROCEDURE — 258N000003 HC RX IP 258 OP 636: Performed by: STUDENT IN AN ORGANIZED HEALTH CARE EDUCATION/TRAINING PROGRAM

## 2023-11-06 PROCEDURE — 85018 HEMOGLOBIN: CPT | Performed by: ADVANCED PRACTICE MIDWIFE

## 2023-11-06 PROCEDURE — 722N000001 HC LABOR CARE VAGINAL DELIVERY SINGLE

## 2023-11-06 PROCEDURE — 86780 TREPONEMA PALLIDUM: CPT | Performed by: ADVANCED PRACTICE MIDWIFE

## 2023-11-06 PROCEDURE — 370N000003 HC ANESTHESIA WARD SERVICE: Performed by: STUDENT IN AN ORGANIZED HEALTH CARE EDUCATION/TRAINING PROGRAM

## 2023-11-06 PROCEDURE — 0DQR0ZZ REPAIR ANAL SPHINCTER, OPEN APPROACH: ICD-10-PCS | Performed by: OBSTETRICS & GYNECOLOGY

## 2023-11-06 RX ORDER — LIDOCAINE 40 MG/G
CREAM TOPICAL
Status: DISCONTINUED | OUTPATIENT
Start: 2023-11-06 | End: 2023-11-06 | Stop reason: HOSPADM

## 2023-11-06 RX ORDER — KETOROLAC TROMETHAMINE 30 MG/ML
30 INJECTION, SOLUTION INTRAMUSCULAR; INTRAVENOUS
Status: DISCONTINUED | OUTPATIENT
Start: 2023-11-06 | End: 2023-11-08 | Stop reason: HOSPADM

## 2023-11-06 RX ORDER — IBUPROFEN 800 MG/1
800 TABLET, FILM COATED ORAL EVERY 6 HOURS PRN
Status: DISCONTINUED | OUTPATIENT
Start: 2023-11-06 | End: 2023-11-08 | Stop reason: HOSPADM

## 2023-11-06 RX ORDER — DOCUSATE SODIUM 100 MG/1
100 CAPSULE, LIQUID FILLED ORAL 2 TIMES DAILY
Status: DISCONTINUED | OUTPATIENT
Start: 2023-11-06 | End: 2023-11-08 | Stop reason: HOSPADM

## 2023-11-06 RX ORDER — HYDROCORTISONE 25 MG/G
CREAM TOPICAL 3 TIMES DAILY PRN
Status: DISCONTINUED | OUTPATIENT
Start: 2023-11-06 | End: 2023-11-08 | Stop reason: HOSPADM

## 2023-11-06 RX ORDER — NALOXONE HYDROCHLORIDE 0.4 MG/ML
0.4 INJECTION, SOLUTION INTRAMUSCULAR; INTRAVENOUS; SUBCUTANEOUS
Status: DISCONTINUED | OUTPATIENT
Start: 2023-11-06 | End: 2023-11-06 | Stop reason: HOSPADM

## 2023-11-06 RX ORDER — FENTANYL/ROPIVACAINE/NS/PF 2MCG/ML-.1
PLASTIC BAG, INJECTION (ML) EPIDURAL
Status: DISCONTINUED | OUTPATIENT
Start: 2023-11-06 | End: 2023-11-06 | Stop reason: HOSPADM

## 2023-11-06 RX ORDER — FENTANYL CITRATE 50 UG/ML
100 INJECTION, SOLUTION INTRAMUSCULAR; INTRAVENOUS
Status: DISCONTINUED | OUTPATIENT
Start: 2023-11-06 | End: 2023-11-06 | Stop reason: HOSPADM

## 2023-11-06 RX ORDER — NALOXONE HYDROCHLORIDE 0.4 MG/ML
0.4 INJECTION, SOLUTION INTRAMUSCULAR; INTRAVENOUS; SUBCUTANEOUS
Status: DISCONTINUED | OUTPATIENT
Start: 2023-11-06 | End: 2023-11-08 | Stop reason: HOSPADM

## 2023-11-06 RX ORDER — ACETAMINOPHEN 325 MG/1
650 TABLET ORAL EVERY 4 HOURS PRN
Status: DISCONTINUED | OUTPATIENT
Start: 2023-11-06 | End: 2023-11-08 | Stop reason: HOSPADM

## 2023-11-06 RX ORDER — NALOXONE HYDROCHLORIDE 0.4 MG/ML
0.2 INJECTION, SOLUTION INTRAMUSCULAR; INTRAVENOUS; SUBCUTANEOUS
Status: DISCONTINUED | OUTPATIENT
Start: 2023-11-06 | End: 2023-11-06 | Stop reason: HOSPADM

## 2023-11-06 RX ORDER — FENTANYL CITRATE-0.9 % NACL/PF 10 MCG/ML
100 PLASTIC BAG, INJECTION (ML) INTRAVENOUS EVERY 5 MIN PRN
Status: DISCONTINUED | OUTPATIENT
Start: 2023-11-06 | End: 2023-11-06 | Stop reason: HOSPADM

## 2023-11-06 RX ORDER — MODIFIED LANOLIN
OINTMENT (GRAM) TOPICAL
Status: DISCONTINUED | OUTPATIENT
Start: 2023-11-06 | End: 2023-11-08 | Stop reason: HOSPADM

## 2023-11-06 RX ORDER — OXYTOCIN/0.9 % SODIUM CHLORIDE 30/500 ML
100-340 PLASTIC BAG, INJECTION (ML) INTRAVENOUS CONTINUOUS PRN
Status: DISCONTINUED | OUTPATIENT
Start: 2023-11-06 | End: 2023-11-08 | Stop reason: HOSPADM

## 2023-11-06 RX ORDER — METHYLERGONOVINE MALEATE 0.2 MG/ML
200 INJECTION INTRAVENOUS
Status: DISCONTINUED | OUTPATIENT
Start: 2023-11-06 | End: 2023-11-08 | Stop reason: HOSPADM

## 2023-11-06 RX ORDER — PENICILLIN G POTASSIUM 5000000 [IU]/1
5 INJECTION, POWDER, FOR SOLUTION INTRAMUSCULAR; INTRAVENOUS ONCE
Status: COMPLETED | OUTPATIENT
Start: 2023-11-06 | End: 2023-11-06

## 2023-11-06 RX ORDER — NALBUPHINE HYDROCHLORIDE 20 MG/ML
2.5-5 INJECTION, SOLUTION INTRAMUSCULAR; INTRAVENOUS; SUBCUTANEOUS EVERY 6 HOURS PRN
Status: DISCONTINUED | OUTPATIENT
Start: 2023-11-06 | End: 2023-11-08 | Stop reason: HOSPADM

## 2023-11-06 RX ORDER — OXYCODONE HYDROCHLORIDE 5 MG/1
5 TABLET ORAL EVERY 4 HOURS PRN
Status: DISCONTINUED | OUTPATIENT
Start: 2023-11-06 | End: 2023-11-08 | Stop reason: HOSPADM

## 2023-11-06 RX ORDER — NALOXONE HYDROCHLORIDE 0.4 MG/ML
0.2 INJECTION, SOLUTION INTRAMUSCULAR; INTRAVENOUS; SUBCUTANEOUS
Status: DISCONTINUED | OUTPATIENT
Start: 2023-11-06 | End: 2023-11-08 | Stop reason: HOSPADM

## 2023-11-06 RX ORDER — OXYTOCIN 10 [USP'U]/ML
10 INJECTION, SOLUTION INTRAMUSCULAR; INTRAVENOUS
Status: DISCONTINUED | OUTPATIENT
Start: 2023-11-06 | End: 2023-11-08 | Stop reason: HOSPADM

## 2023-11-06 RX ORDER — MISOPROSTOL 200 UG/1
800 TABLET ORAL
Status: DISCONTINUED | OUTPATIENT
Start: 2023-11-06 | End: 2023-11-06 | Stop reason: HOSPADM

## 2023-11-06 RX ORDER — ONDANSETRON 4 MG/1
4 TABLET, ORALLY DISINTEGRATING ORAL EVERY 6 HOURS PRN
Status: DISCONTINUED | OUTPATIENT
Start: 2023-11-06 | End: 2023-11-06 | Stop reason: HOSPADM

## 2023-11-06 RX ORDER — ONDANSETRON 2 MG/ML
4 INJECTION INTRAMUSCULAR; INTRAVENOUS EVERY 6 HOURS PRN
Status: DISCONTINUED | OUTPATIENT
Start: 2023-11-06 | End: 2023-11-06 | Stop reason: HOSPADM

## 2023-11-06 RX ORDER — MAGNESIUM HYDROXIDE/ALUMINUM HYDROXICE/SIMETHICONE 120; 1200; 1200 MG/30ML; MG/30ML; MG/30ML
30 SUSPENSION ORAL EVERY 4 HOURS PRN
Status: DISCONTINUED | OUTPATIENT
Start: 2023-11-06 | End: 2023-11-08 | Stop reason: HOSPADM

## 2023-11-06 RX ORDER — SODIUM CHLORIDE, SODIUM LACTATE, POTASSIUM CHLORIDE, CALCIUM CHLORIDE 600; 310; 30; 20 MG/100ML; MG/100ML; MG/100ML; MG/100ML
INJECTION, SOLUTION INTRAVENOUS CONTINUOUS PRN
Status: DISCONTINUED | OUTPATIENT
Start: 2023-11-06 | End: 2023-11-08 | Stop reason: HOSPADM

## 2023-11-06 RX ORDER — CITRIC ACID/SODIUM CITRATE 334-500MG
30 SOLUTION, ORAL ORAL
Status: DISCONTINUED | OUTPATIENT
Start: 2023-11-06 | End: 2023-11-06 | Stop reason: HOSPADM

## 2023-11-06 RX ORDER — METHYLERGONOVINE MALEATE 0.2 MG/ML
200 INJECTION INTRAVENOUS
Status: DISCONTINUED | OUTPATIENT
Start: 2023-11-06 | End: 2023-11-06 | Stop reason: HOSPADM

## 2023-11-06 RX ORDER — PENICILLIN G 3000000 [IU]/50ML
3 INJECTION, SOLUTION INTRAVENOUS EVERY 4 HOURS
Status: DISCONTINUED | OUTPATIENT
Start: 2023-11-06 | End: 2023-11-06 | Stop reason: HOSPADM

## 2023-11-06 RX ORDER — OXYTOCIN/0.9 % SODIUM CHLORIDE 30/500 ML
340 PLASTIC BAG, INJECTION (ML) INTRAVENOUS CONTINUOUS PRN
Status: DISCONTINUED | OUTPATIENT
Start: 2023-11-06 | End: 2023-11-08 | Stop reason: HOSPADM

## 2023-11-06 RX ORDER — CARBOPROST TROMETHAMINE 250 UG/ML
250 INJECTION, SOLUTION INTRAMUSCULAR
Status: DISCONTINUED | OUTPATIENT
Start: 2023-11-06 | End: 2023-11-08 | Stop reason: HOSPADM

## 2023-11-06 RX ORDER — CARBOPROST TROMETHAMINE 250 UG/ML
250 INJECTION, SOLUTION INTRAMUSCULAR
Status: DISCONTINUED | OUTPATIENT
Start: 2023-11-06 | End: 2023-11-06 | Stop reason: HOSPADM

## 2023-11-06 RX ORDER — PROCHLORPERAZINE 25 MG
25 SUPPOSITORY, RECTAL RECTAL EVERY 12 HOURS PRN
Status: DISCONTINUED | OUTPATIENT
Start: 2023-11-06 | End: 2023-11-06 | Stop reason: HOSPADM

## 2023-11-06 RX ORDER — MISOPROSTOL 200 UG/1
400 TABLET ORAL
Status: DISCONTINUED | OUTPATIENT
Start: 2023-11-06 | End: 2023-11-08 | Stop reason: HOSPADM

## 2023-11-06 RX ORDER — MISOPROSTOL 200 UG/1
800 TABLET ORAL
Status: DISCONTINUED | OUTPATIENT
Start: 2023-11-06 | End: 2023-11-08 | Stop reason: HOSPADM

## 2023-11-06 RX ORDER — METOCLOPRAMIDE HYDROCHLORIDE 5 MG/ML
10 INJECTION INTRAMUSCULAR; INTRAVENOUS EVERY 6 HOURS PRN
Status: DISCONTINUED | OUTPATIENT
Start: 2023-11-06 | End: 2023-11-06 | Stop reason: HOSPADM

## 2023-11-06 RX ORDER — MISOPROSTOL 200 UG/1
400 TABLET ORAL
Status: DISCONTINUED | OUTPATIENT
Start: 2023-11-06 | End: 2023-11-06 | Stop reason: HOSPADM

## 2023-11-06 RX ORDER — METOCLOPRAMIDE 10 MG/1
10 TABLET ORAL EVERY 6 HOURS PRN
Status: DISCONTINUED | OUTPATIENT
Start: 2023-11-06 | End: 2023-11-06 | Stop reason: HOSPADM

## 2023-11-06 RX ORDER — IBUPROFEN 800 MG/1
800 TABLET, FILM COATED ORAL
Status: DISCONTINUED | OUTPATIENT
Start: 2023-11-06 | End: 2023-11-08 | Stop reason: HOSPADM

## 2023-11-06 RX ORDER — OXYTOCIN/0.9 % SODIUM CHLORIDE 30/500 ML
340 PLASTIC BAG, INJECTION (ML) INTRAVENOUS CONTINUOUS PRN
Status: DISCONTINUED | OUTPATIENT
Start: 2023-11-06 | End: 2023-11-06 | Stop reason: HOSPADM

## 2023-11-06 RX ORDER — PROCHLORPERAZINE MALEATE 10 MG
10 TABLET ORAL EVERY 6 HOURS PRN
Status: DISCONTINUED | OUTPATIENT
Start: 2023-11-06 | End: 2023-11-06 | Stop reason: HOSPADM

## 2023-11-06 RX ORDER — OXYTOCIN 10 [USP'U]/ML
10 INJECTION, SOLUTION INTRAMUSCULAR; INTRAVENOUS
Status: DISCONTINUED | OUTPATIENT
Start: 2023-11-06 | End: 2023-11-06 | Stop reason: HOSPADM

## 2023-11-06 RX ORDER — FENTANYL CITRATE 50 UG/ML
50 INJECTION, SOLUTION INTRAMUSCULAR; INTRAVENOUS ONCE
Status: COMPLETED | OUTPATIENT
Start: 2023-11-06 | End: 2023-11-06

## 2023-11-06 RX ADMIN — PENICILLIN G 3 MILLION UNITS: 3000000 INJECTION, SOLUTION INTRAVENOUS at 14:50

## 2023-11-06 RX ADMIN — Medication 100 MCG: at 16:16

## 2023-11-06 RX ADMIN — Medication: at 15:29

## 2023-11-06 RX ADMIN — BUPIVACAINE HYDROCHLORIDE 10 ML: 2.5 INJECTION, SOLUTION EPIDURAL; INFILTRATION; INTRACAUDAL at 15:05

## 2023-11-06 RX ADMIN — PENICILLIN G 3 MILLION UNITS: 3000000 INJECTION, SOLUTION INTRAVENOUS at 18:35

## 2023-11-06 RX ADMIN — PENICILLIN G 3 MILLION UNITS: 3000000 INJECTION, SOLUTION INTRAVENOUS at 10:44

## 2023-11-06 RX ADMIN — SODIUM CHLORIDE, POTASSIUM CHLORIDE, SODIUM LACTATE AND CALCIUM CHLORIDE: 600; 310; 30; 20 INJECTION, SOLUTION INTRAVENOUS at 02:44

## 2023-11-06 RX ADMIN — PENICILLIN G 3 MILLION UNITS: 3000000 INJECTION, SOLUTION INTRAVENOUS at 06:45

## 2023-11-06 RX ADMIN — ALUMINUM HYDROXIDE, MAGNESIUM HYDROXIDE, AND DIMETHICONE 30 ML: 200; 20; 200 SUSPENSION ORAL at 13:18

## 2023-11-06 RX ADMIN — Medication 340 ML/HR: at 21:47

## 2023-11-06 RX ADMIN — FENTANYL CITRATE 50 MCG: 50 INJECTION, SOLUTION INTRAMUSCULAR; INTRAVENOUS at 14:46

## 2023-11-06 RX ADMIN — PENICILLIN G POTASSIUM 5 MILLION UNITS: 5000000 POWDER, FOR SOLUTION INTRAMUSCULAR; INTRAPLEURAL; INTRATHECAL; INTRAVENOUS at 02:43

## 2023-11-06 RX ADMIN — Medication 100 MCG: at 16:09

## 2023-11-06 RX ADMIN — Medication 100 MCG: at 16:12

## 2023-11-06 RX ADMIN — SODIUM CHLORIDE, POTASSIUM CHLORIDE, SODIUM LACTATE AND CALCIUM CHLORIDE: 600; 310; 30; 20 INJECTION, SOLUTION INTRAVENOUS at 15:35

## 2023-11-06 ASSESSMENT — ACTIVITIES OF DAILY LIVING (ADL)
ADLS_ACUITY_SCORE: 18

## 2023-11-06 NOTE — H&P
"HISTORY AND PHYSICAL UPDATE ADMISSION EXAM    Name: Hailey Faria  YOB: 1995  Medical Record Number: [id number]    History of Present Illness: Hailey Faria is a 28 year old female who is 40w4d pregnant and being admitted for active labor management and SROM. Patient estimates that her water broke around 2230 on 23 but is unsure of the specific time. She desires an unmedicated birth. She has been laboring in a variety of positions and coping very well. FHT have been normal per intermittent auscultation. Has tried nitrous and is currently in room tub. Feeling increased pressure.    Estimated Date of Delivery: 2023    EGA 40w4d    OB History    Para Term  AB Living   1 0 0 0 0 0   SAB IAB Ectopic Multiple Live Births   0 0 0 0 0      # Outcome Date GA Lbr Harry/2nd Weight Sex Delivery Anes PTL Lv   1 Current                 Lab Results   Component Value Date    ABO O 2019    RH Pos 2019    AS Negative 2023    CHPCRT  2017     Negative   Negative for C. trachomatis rRNA by transcription mediated amplification.   A negative result by transcription mediated amplification does not preclude the   presence of C. trachomatis infection because results are dependent on proper   and adequate collection, absence of inhibitors, and sufficient rRNA to be   detected.      HGB 13.9 2023       Prenatal Complications:   Rubella non-immune  Marginal cord insertion  GBS positive    Exam:      /61   Pulse 63   Temp 98.2  F (36.8  C) (Oral)   Resp 18   Ht 1.702 m (5' 7\")   Wt 86.2 kg (190 lb)   LMP 2023   BMI 29.76 kg/m      Fetal heart Rate Category 1  Contractions every 2-3 x 60 seconds    HEENT grossly normal  ABD gravid, non-tender  EXT:  mild edema, moves freely  Vaginal exam most recent per RN: /-1  Membranes: SROM    Assessment: active labor management, SROM, and  labor    Plan: Admit - see IP orders  Pain medication as " desired  Prophylactic antibiotic for + GBS status  MD consultant on call Dr. Arteaga/ available prn  Anticipate     Prenatal record reviewed.    IVELISSE De León CNM      2023   12:09 PM

## 2023-11-06 NOTE — ANESTHESIA PROCEDURE NOTES
Epidural catheter Procedure Note    Pre-Procedure   Staff -        Anesthesiologist:  Hardy Sheth DO       Performed By: anesthesiologist       Location: OB       Procedure Start/Stop Times: 11/6/2023 3:05 PM and 11/6/2023 3:31 PM       Pre-Anesthestic Checklist: patient identified, IV checked, risks and benefits discussed, informed consent, monitors and equipment checked, pre-op evaluation, at physician/surgeon's request and post-op pain management  Timeout:       Correct Patient: Yes        Correct Procedure: Yes        Correct Site: Yes        Correct Position: Yes   Procedure Documentation  Procedure: epidural catheter       Patient Position: sitting       Patient Prep/Sterile Barriers: sterile gloves, mask, patient draped       Skin prep: Chloraprep       Local skin infiltrated with mL of 2% lidocaine.        Insertion Site: L3-4. (midline approach).       Technique: LORT saline        DL at 7 cm.       Needle Type: IV Cathether       Needle Gauge: 17.        Needle Length (Inches): 3.5        Catheter: 18 G.          Catheter threaded easily.         5 cm epidural space.         Threaded 12 cm at skin.         # of attempts: 1 and  # of redirects:  1    Assessment/Narrative         Paresthesias: No.       Test dose of 3 mL lidocaine 1.5% w/ 1:200,000 epinephrine at.         Test dose negative, 3 minutes after injection, for signs of intravascular, subdural, or intrathecal injection.       Insertion/Infusion Method: LORT saline       Aspiration negative for Heme or CSF via Epidural Catheter.    Medication(s) Administered   0.125% bupivacaine 5 mL + fentanyl 20 mcg + NS 5 mL (Epidural) (Mixture components: BUPivacaine HCl (PF) 0.25 % Soln, 5 mL; fentaNYL (PF) 100 MCG/2ML Soln, 20 mcg; sodium chloride 0.9 % Soln, 5 mL) - EPIDURAL   10 mL - 11/6/2023 3:05:00 PM  Medication Administration Time: 11/6/2023 3:05 PM     Comments:  Epi   R/b/a discussed, patient agrees to have an epidural catheter  "placement.   Local infiltration of skin, advancement of needle without paresthesias, excellent Nash to NS.   Catheter advanced without resistance nor paresthesias, negative aspiration for heme or CSF.  Patient tolerated the procedure well, all questions answered.        FOR [facility] ([facility] ONLY:   Pain Team Contact information: please page the Pain Team Via [employer]. Search \"Pain\". During daytime hours, please page the attending first. At night please page the resident first.      "

## 2023-11-06 NOTE — PROGRESS NOTES
ROM+ positive, pt getting more uncomfortable with contractions. Update provided to Colette RAMOS CNM plan to admit to begin GBS antibiotics.    Decels noted on FHT will continue to monitor.

## 2023-11-06 NOTE — PROGRESS NOTES
"Patient Name:  Hailey Faria  :      1995  MRN:      [id number]    Assessment:     at 40w4d  Active labor  Category 1 FHTs  SROM now AROM of forebag      Plan:   -Discussed R/B/A of amniotomy. She consents to this. Completed with return of clear fluid -Continue position changes to optimize fetal descent.  -Routine support & management. Encourage position changes, ambulation as appropriate, rest as desired.  -Anticipate progress and NSVB.   -Reevaluate progress in 2-4 hours or sooner with a change in status.     Subjective:  Hailey Faria is coping well with contractions. Using N2O for pain relief. Good PO fluid intake. Voiding without issue. Family supportive at bedside. Has been using a variety of positions to help labor progress. Has been in the tub in various positions, tried hanging through a few contractions, peanut ball, now high throne after AROM of forbag. No change in cerix in last 2 hours so patient agreeable to AROM of forebag.      Objective:  /61   Pulse 63   Temp 98.2  F (36.8  C) (Oral)   Resp 18   Ht 1.702 m (5' 7\")   Wt 86.2 kg (190 lb)   LMP 2023   BMI 29.76 kg/m      FHR:Baseline: 145 bpm, Variability: Moderate (6 - 25 bpm), Accelerations: absent and Decelerations: Early    Uterine contractions:pt reported/observation Frequency: Every 3-5 minutes, Duration: 40-60 seconds and Intensity: strong    SVE:/-2 AROM forebag clear fluid    Provider: IVELISSE De León CNM      Date:  2023  Time:  1:38 PM  "

## 2023-11-06 NOTE — PROGRESS NOTES
RN at bedside for 15 minutes following Nitrous Oxide 50/50 inhalation initiation. Patient is observed using the equipment appropriately. Patient appears to be coping with labor. Patient is free of side effects.    Charito Bran RN

## 2023-11-06 NOTE — PLAN OF CARE
Patient vitally stable.  Patient appears to be comfortable after epidrual.  Phenylephrine given x3. Patients BP's run on the lower side at baseline.     Continue to monitor.     Wendy Middleton RN

## 2023-11-06 NOTE — PROGRESS NOTES
Data: Patient presented to [facility]: 2023  1:15 AM.  Reason for maternal/fetal assessment is uterine contractions. Patient reports contractions increasing in intensity throughout the evening. Patient denies headache, visual disturbances. Patient reports fetal movement is normal. Patient is a 40w4d .  Prenatal record reviewed. Pregnancy has been uncomplicated.    Vital signs wnl. Support person is present.     Action: Verbal consent for EFM. Triage assessment completed.     Response: Patient verbalized agreement with plan. Will contact Colette RAMOS with update and further orders.

## 2023-11-06 NOTE — ANESTHESIA PREPROCEDURE EVALUATION
Anesthesia Pre-Procedure Evaluation    Patient: Hailey Faria   MRN: [id number] : 1995        Procedure : * No procedures listed *          Past Medical History:   Diagnosis Date     Anemia      Fatigue      Ovarian cyst      Rectal bleeding       Past Surgical History:   Procedure Laterality Date     DENTAL SURGERY       HC ANGIOGRAPHY VISCERAL SELECT W/WO AORTOGRAM       LAPAROSCOPIC CYSTECTOMY OVARIAN (BENIGN) Right 2019    Procedure: DIAGNOSTIC Laparoscopy, Right ovarian cystectomy and detorsion;  Surgeon: Jey Ramos MD;  Location: [facility] OR     tooth extraction      [place] teet      No Known Allergies   Social History     Tobacco Use     Smoking status: Never     Smokeless tobacco: Never   Substance Use Topics     Alcohol use: Yes     Alcohol/week: 0.0 standard drinks of alcohol     Comment: 2 a month      Wt Readings from Last 1 Encounters:   23 86.2 kg (190 lb)        Anesthesia Evaluation            ROS/MED HX  ENT/Pulmonary:  - neg pulmonary ROS     Neurologic:  - neg neurologic ROS     Cardiovascular:  - neg cardiovascular ROS     METS/Exercise Tolerance:     Hematologic:  - neg hematologic  ROS     Musculoskeletal:  - neg musculoskeletal ROS     GI/Hepatic:  - neg GI/hepatic ROS     Renal/Genitourinary:  - neg Renal ROS     Endo:  - neg endo ROS     Psychiatric/Substance Use:  - neg psychiatric ROS     Infectious Disease:  - neg infectious disease ROS     Malignancy:  - neg malignancy ROS     Other:          Physical Exam    Airway        Mallampati: II   TM distance: > 3 FB   Neck ROM: full     Respiratory Devices and Support         Dental           Cardiovascular   cardiovascular exam normal          Pulmonary   pulmonary exam normal            OUTSIDE LABS:  CBC:   Lab Results   Component Value Date    WBC 8.8 2019    WBC 12.5 (H) 2019    HGB 13.9 2023    HGB 12.2 2019    HCT 37.4 2019    HCT 43.6 2019     2019    PLT  "236 12/18/2019     BMP:   Lab Results   Component Value Date     12/18/2019     07/19/2017    POTASSIUM 3.8 12/18/2019    POTASSIUM 3.7 07/19/2017    CHLORIDE 107 12/18/2019    CHLORIDE 107 07/19/2017    CO2 28 12/18/2019    CO2 25 07/19/2017    BUN 4 (L) 12/18/2019    BUN 11 07/19/2017    CR 0.48 (L) 12/18/2019    CR 0.58 07/19/2017     (H) 12/18/2019    GLC 93 07/19/2017     COAGS: No results found for: \"PTT\", \"INR\", \"FIBR\"  POC:   Lab Results   Component Value Date     (H) 12/19/2019     HEPATIC:   Lab Results   Component Value Date    ALBUMIN 2.5 (L) 12/18/2019    PROTTOTAL 5.7 (L) 12/18/2019    ALT 32 12/18/2019    AST 39 12/18/2019    ALKPHOS 66 12/18/2019    BILITOTAL 0.4 12/18/2019     OTHER:   Lab Results   Component Value Date    MARY 8.5 12/18/2019    LIPASE 105 12/18/2019    TSH 1.39 07/19/2017    T4 1.04 06/27/2014    CRP 0.3 06/27/2014       Anesthesia Plan    ASA Status:  2       Anesthesia Type: Epidural.              Consents    Anesthesia Plan(s) and associated risks, benefits, and realistic alternatives discussed. Questions answered and patient/representative(s) expressed understanding.     - Discussed: Risks, Benefits and Alternatives for the PROCEDURE were discussed     - Discussed with:  Patient            Postoperative Care            Comments:              Hrady Sheth, DO  "

## 2023-11-07 PROCEDURE — 120N000001 HC R&B MED SURG/OB

## 2023-11-07 PROCEDURE — 250N000013 HC RX MED GY IP 250 OP 250 PS 637: Performed by: OBSTETRICS & GYNECOLOGY

## 2023-11-07 RX ADMIN — Medication: at 23:57

## 2023-11-07 RX ADMIN — DOCUSATE SODIUM 100 MG: 100 CAPSULE, LIQUID FILLED ORAL at 16:27

## 2023-11-07 RX ADMIN — IBUPROFEN 800 MG: 800 TABLET ORAL at 01:45

## 2023-11-07 RX ADMIN — ACETAMINOPHEN 650 MG: 325 TABLET ORAL at 08:29

## 2023-11-07 RX ADMIN — IBUPROFEN 800 MG: 800 TABLET ORAL at 08:29

## 2023-11-07 RX ADMIN — DOCUSATE SODIUM 100 MG: 100 CAPSULE, LIQUID FILLED ORAL at 03:48

## 2023-11-07 RX ADMIN — IBUPROFEN 800 MG: 800 TABLET ORAL at 19:46

## 2023-11-07 RX ADMIN — ACETAMINOPHEN 650 MG: 325 TABLET ORAL at 12:03

## 2023-11-07 RX ADMIN — ACETAMINOPHEN 650 MG: 325 TABLET ORAL at 22:25

## 2023-11-07 RX ADMIN — ACETAMINOPHEN 650 MG: 325 TABLET ORAL at 16:27

## 2023-11-07 ASSESSMENT — ACTIVITIES OF DAILY LIVING (ADL)
ADLS_ACUITY_SCORE: 18

## 2023-11-07 NOTE — ANESTHESIA POSTPROCEDURE EVALUATION
Patient: Hailey Faria    Procedure: * No procedures listed *       Anesthesia Type:  No value filed.    Note:  Disposition: Inpatient   Postop Pain Control: Uneventful            Sign Out: Well controlled pain   PONV: No   Neuro/Psych: Uneventful            Sign Out: Acceptable/Baseline neuro status   Airway/Respiratory: Uneventful            Sign Out: Acceptable/Baseline resp. status   CV/Hemodynamics: Uneventful            Sign Out: Acceptable CV status; No obvious hypovolemia; No obvious fluid overload   Other NRE: NONE   DID A NON-ROUTINE EVENT OCCUR? No           Last vitals:  Vitals:    11/06/23 2345 11/07/23 0000 11/07/23 0400   BP: 105/59 104/61 112/53   Pulse:      Resp: 17 16 18   Temp:   36.2  C (97.2  F)   SpO2:   97%       Electronically Signed By: Jhoan Griffith MD  November 7, 2023  5:59 AM

## 2023-11-07 NOTE — L&D DELIVERY NOTE
OB Vaginal Delivery Note    Hailey Faria MRN# [id number]   Age: 28 year old YOB: 1995       GA: 40w4d  GP:   Labor Complications: None   EBL:   mL  Delivery QBL: 200 mL  Delivery Type: Vaginal, Spontaneous   ROM to Delivery Time: (Delivered) Hours: 8 Minutes: 13   Weight:  Not available at the time of this note.   1 Minute 5 Minute 10 Minute   Apgar Totals: 8   9        JUVENTINO BARKLEY;ARBEN MENDOZA;KJ HUDSON     Delivery Details:  Hailey Faria, a 28 year old  female delivered a viable infant with apgars of 8  and 9 . Patient was fully dilated and pushing. Delivery was via vaginal, spontaneous  to a sterile field under nitrous oxide;epidural  anesthesia. Infant delivered in vertex    occiput  anterior  position. Anterior and posterior shoulders delivered without difficulty. The cord was clamped and cut twice. Cord blood was obtained in routine fashion with the following disposition: lab .      Cord complications: nuchal   Placenta delivered at 2023  9:39 PM . Placental disposition was Hospital disposal . Fundal massage performed and fundus found to be firm.     Episiotomy: none    Perineum, vagina, cervix were inspected, and the following lacerations were noted:   Perineal lacerations: 3rd                Any lacerations were repaired in the usual fashion using 2.0 and 3.0 vicryl.    Excellent hemostasis was noted. Needle count correct. Infant and patient in delivery room in good and stable condition.        BienvenidoNhanJayden [[id number]]      Labor Event Times      Latent labor onset date/time: 2023 0100    Active labor onset date: 23 Onset time: 11:00 AM   Dilation complete date: 23 Complete time:  6:05 PM   Start pushing date/time: 2023 1810          Labor Events     labor?: No   steroids: None  Labor Type: Spontaneous  Predominate monitoring during 1st stage: continuous electronic fetal monitoring     Antibiotics received  during labor?: Yes  Reason for Antibiotics: GBS  Antibiotics received for GBS: Penicillin     Rupture identifier: Sac 2  Rupture date/time: 23 1323   Rupture type: Artificial Rupture of Membranes  Fluid color: Clear  Fluid odor: Normal     Augmentation: AROM  Indications for augmentation: Ineffective Contraction Pattern       Delivery/Placenta Date and Time      Delivery Date: 23 Delivery Time:  9:36 PM   Placenta Date/Time: 2023  9:39 PM  Oxytocin given at the time of delivery: after delivery of baby  Delivering clinician: Harjinder Arteaga MD   Other personnel present at delivery:  Provider Role   Daniel Sibley, RN Registered Nurse   Nathalia Maciel RN Registered Nurse   Siobhan Multani APRN CNM Certified Nurse Midwife             Vaginal Counts       Initial count performed by 2 team members:  Two Team Members   dr. joan sibley rn         Needles Suture Needles Sponges (RETIRED) Instruments   Initial counts 0 0 0    Added to count  2 5    Relief counts       Final counts 0 2 5            Placed during labor Accounted for at the end of labor   FSE No NA   IUPC No NA   Cervidil No NA                  Final count performed by 2 team members:  Two Team Members   dr. joan sibley rn      Final count correct?: Yes      Pre-Birth Team Brief: Complete  Post-Birth Team Debrief: Complete       Apgars    Living status: Living   1 Minute 5 Minute 10 Minute 15 Minute 20 Minute   Skin color: 0  1       Heart rate: 2  2       Reflex irritability: 2  2       Muscle tone: 2  2       Respiratory effort: 2  2       Total: 8  9       Apgars assigned by: LEEROY SIBLEY RN       Cord          Cord Complications: Nuchal   Nuchal Intervention: reduced         Nuchal cord description: loose nuchal cord         Cord Blood Disposition: Lab    Gases Sent?: No    Delayed cord clamping?: Yes    Cord Clamping Delay (seconds):  seconds            Stem cell collection?: No           [place] Resuscitation    Methods: None               Labor Events and Shoulder Dystocia    Fetal Tracing Prior to Delivery: Category 2  Shoulder dystocia present?: Neg                 Delivery (Maternal) (Provider to Complete) ([id number])    Episiotomy: None      Perineal lacerations: 3rd    Repair suture: 2-0 Vicryl, 3-0 Vicryl  Number of repair packets: 2  Genital tract inspection done: Pos       Blood Loss  Mother: Hailey Faria #[id number]     Start of Mother's Information      Delivery Blood Loss  11/06/23 1100 - 11/06/23 2221      Delivery QBL (mL) Hospital Encounter 200 mL    Total  200 mL               End of Mother's Information  Mother: Hailey Faria #[id number]                Delivery - Provider to Complete ([id number])    Delivering clinician: Harjinder Arteaga MD  Delivery Type (Choose the 1 that will go to the Birth History): Vaginal, Spontaneous                         Other personnel:  Provider Role   Daniel Bullard RN Registered Nurse   Nathalia Maciel RN Registered Nurse   Siobhan Multani APRN CNM Certified Nurse Midwife                    Placenta    Date/Time: 11/6/2023  9:39 PM  Removal: Spontaneous  Disposition: Hospital disposal             Anesthesia    Method: Nitrous Oxide, Epidural  Cervical dilation at placement: 4-7                    Presentation and Position    Presentation: Vertex     Occiput Anterior                     Harjinder Arteaga MD

## 2023-11-07 NOTE — CONSULTS
Integrative Therapy Consult    Healing PresenceYes  Essential Oils: Topical (EO/Topical Oil)     Leonor -  HC, Lavender Massage Oil - HC       Healing Music:       Breathwork:       Guided Imagery:       Acupressure:       Oshibori:       Energy Therapy:       Healing Touch:       Reiki:       Qi Gong:     Massage: Foot, Targeted massage      Targeted Massage: Legs  Sleep Promotion:       Other Therapy:       Intervention Reason: Edema, Comfort     Pre and Post Session Scores: Patient Desires Treatment: yes                             Delivery:         Referrals:      Qian Arvizu

## 2023-11-07 NOTE — PROGRESS NOTES
"Patient Name:  Hailey Faria  :      1995  MRN:      [id number]    Assessment:     at 40w4d  Active labor  Category 2 FHTs  SROM      Plan:   -Continue position changes to optimize fetal descent.  -Continue pushing  -Anticipate progress and NSVB.     Subjective:  Hailey Faria is coping well with contractions. Has been pushing for about an hour. Tried hands and knees, sidelying, push/pull. Patient strongly prefers semi fowlers. Pelvic floor muscles are very tight. Pushing well but not making fast progress. Using epidural for pain relief. Good PO fluid intake. Cathed for 800cc clear yellow urine at 1810. Having variable decelerations but not with every contractio.      Objective:  BP 97/51   Pulse 63   Temp 98.1  F (36.7  C)   Resp 16   Ht 1.702 m (5' 7\")   Wt 86.2 kg (190 lb)   LMP 2023   SpO2 97%   BMI 29.76 kg/m      FHR:Baseline: 145 bpm, Variability: Moderate (6 - 25 bpm), Accelerations: absent and Decelerations: Variable:    Uterine contractions:TocoFrequency: Every 2-3 minutes, Duration: 60 seconds and Intensity: strong    SVE:complete/+1-+2 with caput at +2     Provider: IVELISSE De León CNM, Dr. aware of patient status and remains available for consultation and collaboration as needed.   Date:  2023  Time:  7:21 PM  "

## 2023-11-07 NOTE — PROGRESS NOTES
"Vaginal Delivery Postpartum Day 1    Patient Name:  Hailey Faria  :      1995  MRN:      [id number]      Assessment:  Normal postpartum course.    Plan:  Continue current care. Mag packs ordered to help with perineal tenderness.  Continue with scheduled stool softners.       Subjective:  The patient feels well:  Voiding without difficulty, lochia normal, tolerating normal diet, ambulating without difficulty and passing flatus. Voiding independently without complication. Pain is well controlled with current medications.  The patient feels she was well taken care of. The patient has no emotional concerns.  The baby is well and being fed by breast.      YOB: 2025   Birth Time: 9:36 PM     Prenatal Complications include:   Rubella non-immune  Marginal cord insertion  GBS positive    Objective:  /70 (BP Location: Right arm, Patient Position: Semi-Neri's, Cuff Size: Adult Regular)   Pulse 63   Temp 97.7  F (36.5  C) (Oral)   Resp 18   Ht 1.702 m (5' 7\")   Wt 86.2 kg (190 lb)   LMP 2023   SpO2 97%   Breastfeeding Unknown   BMI 29.76 kg/m    Patient Vitals for the past 24 hrs:   BP Temp Temp src Resp SpO2   23 0815 120/70 97.7  F (36.5  C) Oral 18 --   23 0400 112/53 97.2  F (36.2  C) Oral 18 97 %   23 0000 104/61 -- -- 16 --   23 2345 105/59 -- -- 17 --   23 2330 114/58 -- -- 16 --   23 2315 107/58 -- -- -- --   23 2300 99/55 98.7  F (37.1  C) Oral 16 --   235 94/52 -- -- -- --   230 104/59 -- -- -- --   23 2215 108/58 -- -- -- --   23 2200 104/52 -- -- -- --   23 2145 139/60 -- -- -- --   23 130/71 -- -- -- 95 %   23 -- 98.1  F (36.7  C) Oral 20 --   23 163 97/51 -- -- -- --   23 -- -- -- -- 97 %   23 1637 93/51 -- -- -- --   23 1635 96/53 -- -- -- --   23 1633 99/55 -- -- -- 98 %   23 1631 96/52 -- -- -- --   23 1629 90/56 -- " -- -- --   11/06/23 1628 -- -- -- -- 99 %   11/06/23 1627 92/51 -- -- -- --   11/06/23 1625 96/51 -- -- -- --   11/06/23 1624 98/57 -- -- -- --   11/06/23 1623 -- -- -- -- 97 %   11/06/23 1622 91/52 -- -- -- --   11/06/23 1618 98/55 -- -- -- 98 %   11/06/23 1617 100/55 98.1  F (36.7  C) -- -- --   11/06/23 1615 98/53 -- -- -- --   11/06/23 1614 112/55 -- -- -- --   11/06/23 1613 -- -- -- -- 98 %   11/06/23 1611 97/56 -- -- -- --   11/06/23 1610 92/53 -- -- -- 96 %   11/06/23 1607 (!) 86/52 -- -- -- --   11/06/23 1605 (!) 85/49 -- -- -- --   11/06/23 1603 (!) 89/51 -- -- -- 96 %   11/06/23 1600 90/52 -- -- -- 96 %   11/06/23 1559 (!) 89/51 -- -- -- --   11/06/23 1557 90/53 -- -- -- --   11/06/23 1555 90/55 -- -- -- --   11/06/23 1553 99/57 -- -- -- 99 %   11/06/23 1551 97/54 -- -- -- --   11/06/23 1549 97/55 -- -- -- --   11/06/23 1548 -- -- -- -- 100 %   11/06/23 1547 96/51 -- -- -- --   11/06/23 1546 104/59 -- -- -- --   11/06/23 1544 (!) 87/56 -- -- -- --   11/06/23 1543 -- -- -- -- 100 %   11/06/23 1538 113/55 -- -- -- 97 %   11/06/23 1535 109/58 -- -- -- --   11/06/23 1534 -- -- -- -- (!) 86 %   11/06/23 1533 119/58 -- -- -- 99 %   11/06/23 1531 119/59 -- -- -- --   11/06/23 1528 -- -- -- -- 94 %   11/06/23 1523 -- -- -- -- 95 %   11/06/23 1522 -- -- -- -- 92 %   11/06/23 1518 -- -- -- -- 95 %   11/06/23 1515 -- -- -- -- 94 %   11/06/23 1513 -- -- -- -- (!) 88 %   11/06/23 1510 -- -- -- -- 93 %   11/06/23 1508 -- -- -- -- 96 %   11/06/23 1504 -- -- -- -- 93 %   11/06/23 1503 -- -- -- -- 97 %   11/06/23 1458 -- -- -- -- 97 %   11/06/23 1453 -- -- -- -- 92 %   11/06/23 1452 -- -- -- -- 93 %   11/06/23 1448 -- -- -- -- (!) 80 %   11/06/23 1447 -- -- -- -- (!) 82 %   11/06/23 1446 -- 98.2  F (36.8  C) Oral 16 --   11/06/23 1300 107/58 98.4  F (36.9  C) Oral 16 --   11/06/23 1130 -- 98.2  F (36.8  C) Oral -- --   11/06/23 1000 109/61 98.2  F (36.8  C) Oral 18 --       Exam: Patient A&O x 3. No acute distress,  breathing unlabored.The amount and color of the lochia is appropriate for the duration of recovery. BF baby at time of visit, did not examine her perineum.     Lab Results   Component Value Date    ABO O 12/18/2019    [facility] Pos 12/18/2019    AS Negative 11/06/2023    CHPCRT  06/21/2017     Negative   Negative for C. trachomatis rRNA by transcription mediated amplification.   A negative result by transcription mediated amplification does not preclude the   presence of C. trachomatis infection because results are dependent on proper   and adequate collection, absence of inhibitors, and sufficient rRNA to be   detected.      HGB 13.9 11/06/2023       Immunization History   Administered Date(s) Administered    COVID-19 Monovalent 18+ (Moderna) 11/09/2021    COVID-19 Vaccine (Jarrett) 04/12/2021    DTAP (<7y) 1995, 1995, 04/25/1995, 11/25/1995, 05/05/1998    HEPA 08/08/2007, 03/19/2008    HIB (PRP-T) 1995, 1995, 01/05/1994    HepB 07/12/2001, 08/22/2001, 07/01/2002    MMR 10/03/1994, 08/22/2001    Meningococcal (Menomune ) 08/08/2007    Poliovirus, inactivated (IPV) 1993, 1993, 01/05/1994    TDAP (Adacel,Boostrix) 03/07/2005, 03/11/2009    Varicella 03/19/2008       Provider:  Gonsalo Dawson CNM    Date:  11/7/2023  Time:  9:47 AM

## 2023-11-07 NOTE — LACTATION NOTE
"This note was copied from a baby's chart.  Rounded on family for lactation support per patient request.  Noah is the first born for Jocelyn.  He delivered via vaginal delivery 17+ ago.  Hailey was pleased with Noah's success after birth however he has become sleepy and now the latch is more shallow and painful.       Educated/reviewed hand expression using \"press, compress and release\".  Mom had great success with deep breast compression.  Assisted the dyad to achieve a deep asymmetrical latch in a football hold on Hailey's left breast.  Multiple attempts done until both Mom and baby were able to have success.  Pain free latch achieved.  Breast compression educated and Noah's sucking and swallowing increased in frequency.  After about 10 min, Noah released the breast and appeared content.      Educated/reviewed milk production of supply and demand.  Encouraged mom to breastfeed on demand with a goal of 8-12 feedings per day to help milk production. Reviewed expectation of transitional milk arriving by 3-5 days of life.     Educated/reviewed signs of milk transfer with gentle tug at the breast, audible swallows and wet and soiled diapers per the education folder I & O.     Encouraged review of education folder for self learning, lactation and community support, indicators to call MD and maternal/family well being.    Provided education and a resource/teaching sheet with QR codes for video support/education for:  Hand expressing and storing breastmilk  Achieving a Deep Asymmetrical Latch  Breastfeeding Positions  How to Choose a breast pump flange size   Side Lying paced bottle feeding if supplementation is needed.    Hailey has an Darya Stride plus breastpump that she has used prior to birth with success.  Reviewed nipple sizing of 19mm right and 20mm left.  Recommended flange sizing of 1-3mm larger for optimal milk expression without pain.      Questions encouraged and addressed.    Ban Mendez " RNC, IBCLC

## 2023-11-07 NOTE — PLAN OF CARE
Problem: Postpartum (Vaginal Delivery)  Goal: Optimal Pain Control and Function  Outcome: Progressing  Goal: Effective Urinary Elimination  Outcome: Progressing     Problem: Breastfeeding  Goal: Effective Breastfeeding  Outcome: Progressing   Goal Outcome Evaluation:       VSS. Patient not complaining of pain at this time. Pain controlled with ibuprofen. Pt voiding without issue, educated on when to try and go to the bathroom. Breastfeeding education given, patient verbalized understanding. Pt resting comfortably.

## 2023-11-08 VITALS
HEIGHT: 67 IN | OXYGEN SATURATION: 97 % | WEIGHT: 190 LBS | HEART RATE: 63 BPM | DIASTOLIC BLOOD PRESSURE: 67 MMHG | TEMPERATURE: 97.7 F | SYSTOLIC BLOOD PRESSURE: 107 MMHG | RESPIRATION RATE: 16 BRPM | BODY MASS INDEX: 29.82 KG/M2

## 2023-11-08 PROCEDURE — 3E0234Z INTRODUCTION OF SERUM, TOXOID AND VACCINE INTO MUSCLE, PERCUTANEOUS APPROACH: ICD-10-PCS | Performed by: OBSTETRICS & GYNECOLOGY

## 2023-11-08 PROCEDURE — 90707 MMR VACCINE SC: CPT | Performed by: OBSTETRICS & GYNECOLOGY

## 2023-11-08 PROCEDURE — 90471 IMMUNIZATION ADMIN: CPT | Performed by: OBSTETRICS & GYNECOLOGY

## 2023-11-08 PROCEDURE — 250N000011 HC RX IP 250 OP 636: Performed by: OBSTETRICS & GYNECOLOGY

## 2023-11-08 PROCEDURE — 250N000013 HC RX MED GY IP 250 OP 250 PS 637: Performed by: OBSTETRICS & GYNECOLOGY

## 2023-11-08 RX ADMIN — IBUPROFEN 800 MG: 800 TABLET ORAL at 11:28

## 2023-11-08 RX ADMIN — BENZOCAINE AND LEVOMENTHOL: 200; 5 SPRAY TOPICAL at 12:21

## 2023-11-08 RX ADMIN — DOCUSATE SODIUM 100 MG: 100 CAPSULE, LIQUID FILLED ORAL at 11:29

## 2023-11-08 RX ADMIN — MEASLES, MUMPS, AND RUBELLA VIRUS VACCINE LIVE 0.5 ML: 1000; 12500; 1000 INJECTION, POWDER, LYOPHILIZED, FOR SUSPENSION SUBCUTANEOUS at 13:22

## 2023-11-08 RX ADMIN — ACETAMINOPHEN 650 MG: 325 TABLET ORAL at 11:28

## 2023-11-08 RX ADMIN — ACETAMINOPHEN 650 MG: 325 TABLET ORAL at 04:40

## 2023-11-08 RX ADMIN — IBUPROFEN 800 MG: 800 TABLET ORAL at 04:40

## 2023-11-08 ASSESSMENT — ACTIVITIES OF DAILY LIVING (ADL)
ADLS_ACUITY_SCORE: 18

## 2023-11-08 NOTE — PLAN OF CARE
Patient discharged home around 1545 with significant other and infant. Discharge education completed and AVS printed and reviewed. All personal belongings were returned to patient. Patient verbalized and demonstrated understanding and had no further questions or concerns. Patient declined wheelchair transportation and ambulated to [facility] accompanied by hospital staff.

## 2023-11-08 NOTE — PROGRESS NOTES
"Vaginal Delivery Postpartum Day 2    Patient Name:  Hailey Faria  :      1995  MRN:      [id number]      Assessment:  Normal postpartum course.    Plan:    Continue current care.  Discharge home today  Follow up in clinic in 6 weeks, sooner PRN    Subjective:  The patient feels well:  Voiding without difficulty, lochia normal, tolerating normal diet, ambulating without difficulty and passing flatus.  Voiding independently without complication. Mostly has perineal pain which is well-controlled with current medications, ice packs, and sitz baths. The patient has no emotional concerns.  Baby renée Zamora is well and being fed by breast.    YOB: 2025   Birth Time: 9:36 PM     Prenatal complications:  Rubella non-immune  Marginal cord insertion  GBS positive    Objective:  /77 (BP Location: Right arm, Patient Position: Semi-Neri's, Cuff Size: Adult Regular)   Pulse 64   Temp 97.8  F (36.6  C) (Oral)   Resp 16   Ht 1.702 m (5' 7\")   Wt 86.2 kg (190 lb)   LMP 2023   SpO2 97%   Breastfeeding Unknown   BMI 29.76 kg/m      .  Patient Vitals for the past 24 hrs:   BP Temp Temp src Pulse Resp SpO2   23 0440 108/77 97.8  F (36.6  C) Oral 64 16 97 %   23 2145 101/59 98.1  F (36.7  C) Oral 67 16 --   23 1600 104/58 -- Oral 66 16 --       Exam: Patient A&O x 3. No acute distress, breathing unlabored.The amount and color of the lochia is appropriate for the duration of recovery.     Nursing notes reviewed    Lab Results   Component Value Date    ABO O 2019    RH Pos 2019    AS Negative 2023    CHPCRT  2017     Negative   Negative for C. trachomatis rRNA by transcription mediated amplification.   A negative result by transcription mediated amplification does not preclude the   presence of C. trachomatis infection because results are dependent on proper   and adequate collection, absence of inhibitors, and sufficient rRNA to be   detected.      HGB " 13.9 11/06/2023       Immunization History   Administered Date(s) Administered    COVID-19 Monovalent 18+ (Moderna) 11/09/2021    COVID-19 Vaccine (Jarrett) 04/12/2021    DTAP (<7y) 1995, 1993, 04/25/1995, 11/25/1995, 05/05/1998    HEPA 08/08/2007, 03/19/2008    HIB (PRP-T) 1993, 1993, 01/05/1994    HepB 07/12/2001, 08/22/2001, 07/01/2002    MMR 10/03/1994, 08/22/2001    Meningococcal (Menomune ) 08/08/2007    Poliovirus, inactivated (IPV) 1993, 1993, 01/05/1994    TDAP (Adacel,Boostrix) 03/07/2005, 03/11/2009    Varicella 03/19/2008       Provider:  SIENA Mendez    Date:  11/8/2023  Time:  10:54 AM

## 2023-11-08 NOTE — PLAN OF CARE
"  Problem: Adult Inpatient Plan of Care  Goal: Plan of Care Review  Description: The Plan of Care Review/Shift note should be completed every shift.  The Outcome Evaluation is a brief statement about your assessment that the patient is improving, declining, or no change.  This information will be displayed automatically on your shift  note.  Outcome: Progressing  Goal: Patient-Specific Goal (Individualized)  Description: You can add care plan individualizations to a care plan. Examples of Individualization might be:  \"Parent requests to be called daily at 9am for status\", \"I have a hard time hearing out of my right ear\", or \"Do not touch me to wake me up as it startles  me\".  Outcome: Progressing  Goal: Absence of Hospital-Acquired Illness or Injury  Outcome: Progressing  Goal: Optimal Comfort and Wellbeing  Outcome: Progressing  Intervention: Provide Person-Centered Care  Recent Flowsheet Documentation  Taken 11/8/2023 0440 by Celena Marie RN  Trust Relationship/Rapport:   care explained   choices provided   emotional support provided   empathic listening provided   questions answered  Taken 11/7/2023 2145 by Celena Marie, RN  Trust Relationship/Rapport:   care explained   choices provided   emotional support provided   empathic listening provided   questions answered  Goal: Readiness for Transition of Care  Outcome: Progressing     Problem: Postpartum (Vaginal Delivery)  Goal: Successful Parent Role Transition  Outcome: Progressing  Goal: Hemostasis  Outcome: Progressing  Goal: Absence of Infection Signs and Symptoms  Outcome: Progressing  Goal: Anesthesia/Sedation Recovery  Outcome: Progressing  Goal: Optimal Pain Control and Function  Outcome: Progressing  Goal: Effective Urinary Elimination  Outcome: Progressing     Problem: Breastfeeding  Goal: Effective Breastfeeding  Outcome: Progressing   Goal Outcome Evaluation:       Baby breastfeeding on demand every 2-3 hours. Pain controlled with ibuprofen and " tylenol. Up independently, voiding without difficulty. Postpartum assessment WNL, see flowsheets for more information. Significant other at bedside, supportive. Caregiver education and support on-going.    Celena Marie RN

## 2023-11-08 NOTE — PLAN OF CARE
Problem: Postpartum (Vaginal Delivery)  Goal: Successful Parent Role Transition  Outcome: Progressing     Problem: Postpartum (Vaginal Delivery)  Goal: Absence of Infection Signs and Symptoms  Outcome: Progressing     Problem: Breastfeeding  Goal: Effective Breastfeeding  Outcome: Progressing   Goal Outcome Evaluation:

## 2023-11-08 NOTE — DISCHARGE INSTRUCTIONS

## 2023-11-08 NOTE — DISCHARGE SUMMARY
OB Discharge Summary      Date:  2023    Name:  Hailey Faria  :  1995  MRN:  [id number]      Admission Date:  2023  Delivery Date: 2023   Gestational Age at Delivery:  40w4d  Discharge Date:  2023    Principal Diagnosis:    Patient Active Problem List   Diagnosis    Health Care Home    Right flank pain    Post-op pain    Encounter for triage in pregnant patient    Term pregnancy         Conditions complicating Pregnancy: Rubella non-immune  Marginal cord insertion  GBS positive    Procedure(s) Performed:      Indication for :  N/A  Indication for Induction:  N/A     Condition at Discharge:  Stable    Discharge Medications:      Review of your medicines        CONTINUE these medicines which have NOT CHANGED        Dose / Directions   acetaminophen 325 MG tablet  Commonly known as: TYLENOL  Used for: Post-op pain      Dose: 325-650 mg  Take 1-2 tablets (325-650 mg) by mouth every 6 hours as needed for mild pain  Refills: 0     docusate sodium 100 MG capsule  Commonly known as: COLACE  Used for: Post-op pain      Dose: 100 mg  Take 1 capsule (100 mg) by mouth 2 times daily as needed for constipation  Refills: 0     ferrous sulfate 325 (65 Fe) MG tablet  Commonly known as: FEROSUL      Dose: 325 mg  Take 325 mg by mouth daily  Refills: 0     ibuprofen 600 MG tablet  Commonly known as: ADVIL/MOTRIN  Used for: Post-op pain      Dose: 600 mg  Take 1 tablet (600 mg) by mouth every 6 hours as needed for moderate pain  Refills: 0     multivitamin w/minerals tablet      Dose: 1 tablet  Take 1 tablet by mouth daily  Refills: 0            STOP taking      oxyCODONE 5 MG tablet  Commonly known as: ROXICODONE        spironolactone 50 MG tablet  Commonly known as: ALDACTONE                  Discharge Plan:    Follow up with /LISBET:  At 6 weeks PP with [facility] [place]   Patient Instructions:      Physical activity: As tolerated. Nothing in the vagina for 6 weeks.    Diet:  Regular    Medication:  see above    Other:        Physician/CNM: Chanda Hyman CNM    Name:  Hailey Faria  :  1995  MRN:  [id number]

## 2025-04-28 ENCOUNTER — OFFICE VISIT (OUTPATIENT)
Dept: FAMILY MEDICINE | Facility: CLINIC | Age: 30
End: 2025-04-28

## 2025-04-28 VITALS
SYSTOLIC BLOOD PRESSURE: 111 MMHG | HEIGHT: 67 IN | HEART RATE: 72 BPM | WEIGHT: 181 LBS | DIASTOLIC BLOOD PRESSURE: 61 MMHG | BODY MASS INDEX: 28.41 KG/M2 | OXYGEN SATURATION: 95 %

## 2025-04-28 DIAGNOSIS — R53.83 OTHER FATIGUE: Primary | ICD-10-CM

## 2025-04-28 DIAGNOSIS — R11.0 NAUSEA: ICD-10-CM

## 2025-04-28 LAB
% GRANULOCYTES: 43.9 % (ref 42.2–75.2)
ALBUMIN SERPL BCG-MCNC: 4.3 G/DL (ref 3.5–5.2)
ALP SERPL-CCNC: 99 U/L (ref 40–150)
ALT SERPL W P-5'-P-CCNC: 28 U/L (ref 0–50)
ANION GAP SERPL CALCULATED.3IONS-SCNC: 11 MMOL/L (ref 7–15)
AST SERPL W P-5'-P-CCNC: 22 U/L (ref 0–45)
BILIRUB SERPL-MCNC: 0.6 MG/DL
BUN SERPL-MCNC: 8.6 MG/DL (ref 6–20)
CALCIUM SERPL-MCNC: 9.4 MG/DL (ref 8.8–10.4)
CHLORIDE SERPL-SCNC: 102 MMOL/L (ref 98–107)
CREAT SERPL-MCNC: 0.58 MG/DL (ref 0.51–0.95)
EGFRCR SERPLBLD CKD-EPI 2021: >90 ML/MIN/1.73M2
FASTING STATUS PATIENT QL REPORTED: NO
FERRITIN SERPL-MCNC: 36 NG/ML (ref 6–175)
GLUCOSE SERPL-MCNC: 88 MG/DL (ref 70–99)
HCO3 SERPL-SCNC: 25 MMOL/L (ref 22–29)
HCT VFR BLD AUTO: 42.3 % (ref 35–46)
HEMOGLOBIN: 14.4 G/DL (ref 11.8–15.5)
IRON BINDING CAPACITY (ROCHE): 316 UG/DL (ref 240–430)
IRON SATN MFR SERPL: 59 % (ref 15–46)
IRON SERPL-MCNC: 186 UG/DL (ref 37–145)
LYMPHOCYTES NFR BLD AUTO: 44.6 % (ref 20.5–51.1)
MCH RBC QN AUTO: 30.4 PG (ref 27–31)
MCHC RBC AUTO-ENTMCNC: 34.2 G/DL (ref 33–37)
MCV RBC AUTO: 89 FL (ref 80–100)
MONOCYTES NFR BLD AUTO: 11.5 % (ref 1.7–9.3)
PLATELET # BLD AUTO: 257 K/UL (ref 140–450)
POTASSIUM SERPL-SCNC: 4 MMOL/L (ref 3.4–5.3)
PROT SERPL-MCNC: 7.3 G/DL (ref 6.4–8.3)
RBC # BLD AUTO: 4.75 X10/CMM (ref 3.7–5.2)
SODIUM SERPL-SCNC: 138 MMOL/L (ref 135–145)
TSH SERPL DL<=0.005 MIU/L-ACNC: 1.42 UIU/ML (ref 0.3–4.2)
VIT B12 SERPL-MCNC: 734 PG/ML (ref 232–1245)
VIT D+METAB SERPL-MCNC: 21 NG/ML (ref 20–50)
WBC # BLD AUTO: 3.6 X10/CMM (ref 3.8–11)

## 2025-04-28 PROCEDURE — 82607 VITAMIN B-12: CPT | Performed by: STUDENT IN AN ORGANIZED HEALTH CARE EDUCATION/TRAINING PROGRAM

## 2025-04-28 PROCEDURE — 82728 ASSAY OF FERRITIN: CPT | Performed by: STUDENT IN AN ORGANIZED HEALTH CARE EDUCATION/TRAINING PROGRAM

## 2025-04-28 PROCEDURE — 84155 ASSAY OF PROTEIN SERUM: CPT | Performed by: STUDENT IN AN ORGANIZED HEALTH CARE EDUCATION/TRAINING PROGRAM

## 2025-04-28 PROCEDURE — 80050 GENERAL HEALTH PANEL: CPT | Performed by: STUDENT IN AN ORGANIZED HEALTH CARE EDUCATION/TRAINING PROGRAM

## 2025-04-28 PROCEDURE — 83550 IRON BINDING TEST: CPT | Performed by: STUDENT IN AN ORGANIZED HEALTH CARE EDUCATION/TRAINING PROGRAM

## 2025-04-28 PROCEDURE — 84443 ASSAY THYROID STIM HORMONE: CPT | Performed by: STUDENT IN AN ORGANIZED HEALTH CARE EDUCATION/TRAINING PROGRAM

## 2025-04-28 PROCEDURE — 99203 OFFICE O/P NEW LOW 30 MIN: CPT | Performed by: STUDENT IN AN ORGANIZED HEALTH CARE EDUCATION/TRAINING PROGRAM

## 2025-04-28 PROCEDURE — 36415 COLL VENOUS BLD VENIPUNCTURE: CPT | Performed by: STUDENT IN AN ORGANIZED HEALTH CARE EDUCATION/TRAINING PROGRAM

## 2025-04-28 PROCEDURE — 83540 ASSAY OF IRON: CPT | Mod: 90 | Performed by: STUDENT IN AN ORGANIZED HEALTH CARE EDUCATION/TRAINING PROGRAM

## 2025-04-28 PROCEDURE — 3074F SYST BP LT 130 MM HG: CPT | Performed by: STUDENT IN AN ORGANIZED HEALTH CARE EDUCATION/TRAINING PROGRAM

## 2025-04-28 PROCEDURE — 3078F DIAST BP <80 MM HG: CPT | Performed by: STUDENT IN AN ORGANIZED HEALTH CARE EDUCATION/TRAINING PROGRAM

## 2025-04-28 PROCEDURE — 82306 VITAMIN D 25 HYDROXY: CPT | Performed by: STUDENT IN AN ORGANIZED HEALTH CARE EDUCATION/TRAINING PROGRAM

## 2025-04-28 PROCEDURE — 82306 VITAMIN D 25 HYDROXY: CPT | Mod: 90 | Performed by: STUDENT IN AN ORGANIZED HEALTH CARE EDUCATION/TRAINING PROGRAM

## 2025-04-28 RX ORDER — MAGNESIUM CARB/ALUMINUM HYDROX 105-160MG
148 TABLET,CHEWABLE ORAL ONCE
COMMUNITY
End: 2025-04-28

## 2025-04-28 RX ORDER — PRENATAL VIT/IRON FUM/FOLIC AC 27MG-0.8MG
1 TABLET ORAL DAILY
COMMUNITY

## 2025-04-28 RX ORDER — MULTIVITAMIN WITH IRON
1 TABLET ORAL DAILY
COMMUNITY

## 2025-04-28 ASSESSMENT — ENCOUNTER SYMPTOMS
VOMITING: 1
HEADACHES: 1
NAUSEA: 1
ABDOMINAL PAIN: 1
WEAKNESS: 1
BLOOD IN STOOL: 1
CONSTIPATION: 1

## 2025-04-28 NOTE — PROGRESS NOTES
"  Assessment & Plan   Assessment & Plan  Other fatigue  -will look for other causes of fatigue   -anemia   -iron deficiency~ patient has a history   -kidney function   -thyroid function   -vitamin D deficiency   -vitamin B12 deficiency  -if blood work all normal, consider further elimination diet   -patient states that she has seen many GI in the past  Orders:    VENOUS COLLECTION    CBC with Diff/Plt ([facility])    Comprehensive metabolic panel; Future    Ferritin; Future    Iron & Iron Binding Capacity; Future    TSH with free T4 reflex; Future    Vitamin D Deficiency; Future    Vitamin B12; Future    Nausea  -patient not interested in Zofran at this time  Orders:    VENOUS COLLECTION    CBC with Diff/Plt ([facility])    Comprehensive metabolic panel; Future    Ferritin; Future    Iron & Iron Binding Capacity; Future    TSH with free T4 reflex; Future    Vitamin D Deficiency; Future    Vitamin B12; Future          BMI  Estimated body mass index is 28.78 kg/m  as calculated from the following:    Height as of this encounter: 1.689 m (5' 6.5\").    Weight as of this encounter: 82.1 kg (181 lb).     Follow-up   Return in about 3 months (around 7/28/2025) for Routine preventive. 3-6 months.    Subjective   Hailey is a 29 year old, presenting for the following health issues:  Gastrointestinal Problem (Nausea, fatigue, and muscle weakness. Sudden onset of sx about 2 weeks ago. Has had an episode like this before, resolved on its own./Denies stool changes or fever) and Establish Care (Last CPX: Has been seeing OB for the last few years (MARIBEL signed)/PAP: [facility]/Vaccines: Covid due (None in stock))    History of Present Illness       Reason for visit:  [place] / fatigue  Symptom onset:  1-2 weeks ago  Symptoms include:  Nausea, vomiting (has subsided), fatigue, headaches (have subsided), stomach bloating  Symptom intensity:  Moderate  Symptom progression:  Improving  Had these symptoms before:  Yes  Has tried/received " "treatment for these symptoms:  No  What makes it worse:  Breastfeeding  What makes it better:  No   She is taking medications regularly.        New Patient    Social History  Smoking History: never smoker  Alcohol Use History: yes, very rare  Illicit Drug Use History: no    Patient has a history of no known allergies to medication.    Patient has a PSHx of wisdom tooth extraction, ovarian cystectomy, angiogram.    Patient has a negative Fhx.    Patient has a PMHx of ovarian cyst and torsion.    Patient has a history of iron deficiency anemia.    Patient has an 18 month old.    Patient is concerned about recent bouts of nausea and fatigue. Patient states that she had GI symptoms when younger. States she was gluten free, dairy free, and no meat before pregnancy. Feels like everything changed with pregnancy. She felt like her stomach improved and did not have dietary restrictions. Patient is concerned about sx return. Patient eliminated gluten without improvement. Patient states that she has been better the last couple of days. Patient states that the sx are intermittent. She predominately experiencing nausea, vomiting, stomach pain, constipation, weakness, fatigue, and headaches. States that she has had multiple episodes in the last month. Patient states that aft first she thought it was food posioning. Per upper abdominal pain by the stomach felt like there was a bubble that wasn't passing. Patient endorses that she has had a hemorrhoid post birth, so blood in stool is not new or different for her. Patient states that this feels different from her previous iron deficiency.    Review of Systems   Constitutional:  Positive for malaise/fatigue.   Gastrointestinal:  Positive for abdominal pain, blood in stool, constipation, nausea and vomiting.   Neurological:  Positive for weakness and headaches.          Objective    /61   Pulse 72   Ht 1.689 m (5' 6.5\")   Wt 82.1 kg (181 lb)   LMP 04/07/2025   SpO2 95%   " Breastfeeding Yes   BMI 28.78 kg/m    Body mass index is 28.78 kg/m .  Physical Exam  Constitutional:       General: She is not in acute distress.     Appearance: Normal appearance.   HENT:      Head: Normocephalic and atraumatic.   Eyes:      Conjunctiva/sclera: Conjunctivae normal.   Cardiovascular:      Rate and Rhythm: Normal rate and regular rhythm.      Pulses: Normal pulses.      Heart sounds: Normal heart sounds.   Pulmonary:      Effort: Pulmonary effort is normal. No respiratory distress.      Breath sounds: Normal breath sounds.   Abdominal:      General: Abdomen is flat. Bowel sounds are normal. There is no distension.      Palpations: Abdomen is soft. There is no mass.      Tenderness: There is no abdominal tenderness.   Skin:     General: Skin is warm and dry.   Neurological:      Mental Status: She is alert.   Psychiatric:         Thought Content: Thought content normal.        Signed Electronically by: Shiloh Sheth DO

## 2025-04-29 ENCOUNTER — TRANSFERRED RECORDS (OUTPATIENT)
Dept: FAMILY MEDICINE | Facility: CLINIC | Age: 30
End: 2025-04-29

## 2025-05-11 ENCOUNTER — HEALTH MAINTENANCE LETTER (OUTPATIENT)
Age: 30
End: 2025-05-11

## (undated) DEVICE — ADH SKIN CLOSURE PREMIERPRO EXOFIN MICOR HV 0.5ML 3471

## (undated) DEVICE — SOL NACL 0.9% IRRIG 3000ML BAG 2B7477

## (undated) DEVICE — ENDO TROCAR FIRST ENTRY KII FIOS Z-THRD 05X100MM CTF03

## (undated) DEVICE — SOL NACL 0.9% IRRIG 1000ML BOTTLE 2F7124

## (undated) DEVICE — ENDO TROCAR SLEEVE KII Z-THREADED 05X100MM CTS02

## (undated) DEVICE — ENDO TROCAR FIRST ENTRY KII FIOS Z-THRD 12X100MM CTF73

## (undated) DEVICE — SUCTION CANISTER MEDIVAC LINER 3000ML W/LID 65651-530

## (undated) DEVICE — BAG CLEAR TRASH 1.3M 39X33" P4040C

## (undated) DEVICE — GLOVE PROTEXIS MICRO 8.0  2D73PM80

## (undated) DEVICE — GLOVE PROTEXIS W/NEU-THERA 7.5  2D73TE75

## (undated) DEVICE — PAD CHUX UNDERPAD 30X36" P3036C

## (undated) DEVICE — ESU GROUND PAD ADULT W/CORD E7507

## (undated) DEVICE — LINEN HALF SHEET 5512

## (undated) DEVICE — SU VICRYL 4-0 PS-2 18" UND J496H

## (undated) DEVICE — LINEN FULL SHEET 5511

## (undated) DEVICE — ESU CORD MONOPOLAR 10'  E0510

## (undated) DEVICE — NDL INSUFFLATION 13GA 120MM C2201

## (undated) DEVICE — PACK GYN LAPAROSCOPY RIDGES

## (undated) DEVICE — DRAPE MAYO STAND 23X54 8337

## (undated) DEVICE — CATH TRAY FOLEY SURESTEP 16FR DRAIN BAG STATOCK A899916

## (undated) DEVICE — SUCTION IRR STRYKERFLOW II W/TIP 250-070-520

## (undated) DEVICE — GLOVE PROTEXIS BLUE W/NEU-THERA 8.0  2D73EB80

## (undated) RX ORDER — PROPOFOL 10 MG/ML
INJECTION, EMULSION INTRAVENOUS
Status: DISPENSED
Start: 2019-12-18

## (undated) RX ORDER — CEFAZOLIN SODIUM 2 G/100ML
INJECTION, SOLUTION INTRAVENOUS
Status: DISPENSED
Start: 2019-12-18

## (undated) RX ORDER — PHENAZOPYRIDINE HYDROCHLORIDE 200 MG/1
TABLET, FILM COATED ORAL
Status: DISPENSED
Start: 2019-12-18

## (undated) RX ORDER — GLYCOPYRROLATE 0.2 MG/ML
INJECTION INTRAMUSCULAR; INTRAVENOUS
Status: DISPENSED
Start: 2019-12-18

## (undated) RX ORDER — FENTANYL CITRATE 50 UG/ML
INJECTION, SOLUTION INTRAMUSCULAR; INTRAVENOUS
Status: DISPENSED
Start: 2019-12-18

## (undated) RX ORDER — ONDANSETRON 2 MG/ML
INJECTION INTRAMUSCULAR; INTRAVENOUS
Status: DISPENSED
Start: 2019-12-18

## (undated) RX ORDER — BUPIVACAINE HYDROCHLORIDE AND EPINEPHRINE 2.5; 5 MG/ML; UG/ML
INJECTION, SOLUTION EPIDURAL; INFILTRATION; INTRACAUDAL; PERINEURAL
Status: DISPENSED
Start: 2019-12-18

## (undated) RX ORDER — LIDOCAINE HYDROCHLORIDE 10 MG/ML
INJECTION, SOLUTION EPIDURAL; INFILTRATION; INTRACAUDAL; PERINEURAL
Status: DISPENSED
Start: 2019-12-18

## (undated) RX ORDER — NEOSTIGMINE METHYLSULFATE 1 MG/ML
VIAL (ML) INJECTION
Status: DISPENSED
Start: 2019-12-18

## (undated) RX ORDER — DEXAMETHASONE SODIUM PHOSPHATE 4 MG/ML
INJECTION, SOLUTION INTRA-ARTICULAR; INTRALESIONAL; INTRAMUSCULAR; INTRAVENOUS; SOFT TISSUE
Status: DISPENSED
Start: 2019-12-18